# Patient Record
Sex: FEMALE | Race: BLACK OR AFRICAN AMERICAN | NOT HISPANIC OR LATINO | Employment: OTHER | URBAN - METROPOLITAN AREA
[De-identification: names, ages, dates, MRNs, and addresses within clinical notes are randomized per-mention and may not be internally consistent; named-entity substitution may affect disease eponyms.]

---

## 2017-01-09 DIAGNOSIS — I10 ESSENTIAL HYPERTENSION: ICD-10-CM

## 2017-01-09 RX ORDER — INDAPAMIDE 2.5 MG/1
TABLET ORAL
Qty: 30 TABLET | Refills: 11 | Status: SHIPPED | OUTPATIENT
Start: 2017-01-09 | End: 2017-03-19 | Stop reason: SDUPTHER

## 2017-01-16 ENCOUNTER — OFFICE VISIT (OUTPATIENT)
Dept: PAIN MEDICINE | Facility: CLINIC | Age: 67
End: 2017-01-16
Payer: MEDICARE

## 2017-01-16 VITALS
HEIGHT: 62 IN | HEART RATE: 89 BPM | RESPIRATION RATE: 16 BRPM | BODY MASS INDEX: 37.91 KG/M2 | DIASTOLIC BLOOD PRESSURE: 79 MMHG | SYSTOLIC BLOOD PRESSURE: 135 MMHG | WEIGHT: 206 LBS

## 2017-01-16 DIAGNOSIS — M19.012 PRIMARY OSTEOARTHRITIS OF BOTH SHOULDERS: ICD-10-CM

## 2017-01-16 DIAGNOSIS — M47.812 SPONDYLOSIS OF CERVICAL REGION WITHOUT MYELOPATHY OR RADICULOPATHY: ICD-10-CM

## 2017-01-16 DIAGNOSIS — M51.36 DDD (DEGENERATIVE DISC DISEASE), LUMBAR: ICD-10-CM

## 2017-01-16 DIAGNOSIS — M47.817 SPONDYLOSIS OF LUMBOSACRAL REGION WITHOUT MYELOPATHY OR RADICULOPATHY: ICD-10-CM

## 2017-01-16 DIAGNOSIS — M54.16 LEFT LUMBAR RADICULOPATHY: ICD-10-CM

## 2017-01-16 DIAGNOSIS — M19.011 PRIMARY OSTEOARTHRITIS OF BOTH SHOULDERS: ICD-10-CM

## 2017-01-16 PROCEDURE — 99499 UNLISTED E&M SERVICE: CPT | Mod: S$PBB,,, | Performed by: PHYSICIAN ASSISTANT

## 2017-01-16 PROCEDURE — 1159F MED LIST DOCD IN RCRD: CPT | Mod: S$GLB,,, | Performed by: PHYSICIAN ASSISTANT

## 2017-01-16 PROCEDURE — 99999 PR PBB SHADOW E&M-EST. PATIENT-LVL IV: CPT | Mod: PBBFAC,,, | Performed by: PHYSICIAN ASSISTANT

## 2017-01-16 PROCEDURE — 3078F DIAST BP <80 MM HG: CPT | Mod: S$GLB,,, | Performed by: PHYSICIAN ASSISTANT

## 2017-01-16 PROCEDURE — 3075F SYST BP GE 130 - 139MM HG: CPT | Mod: S$GLB,,, | Performed by: PHYSICIAN ASSISTANT

## 2017-01-16 PROCEDURE — 99214 OFFICE O/P EST MOD 30 MIN: CPT | Mod: S$GLB,,, | Performed by: PHYSICIAN ASSISTANT

## 2017-01-16 RX ORDER — TRAMADOL HYDROCHLORIDE AND ACETAMINOPHEN 37.5; 325 MG/1; MG/1
1 TABLET, FILM COATED ORAL 2 TIMES DAILY PRN
Qty: 60 TABLET | Refills: 2 | Status: SHIPPED | OUTPATIENT
Start: 2017-01-16 | End: 2017-02-15

## 2017-01-16 NOTE — PROGRESS NOTES
Chief Pain Complaint:  Low Back Pain, Mid Back Pain, Left Leg Pain  Bilateral Shoulder Pain      History of Present Illness:  This patient is a 66 y.o. female who presents today complaining of the above noted pain/s. The patient describes this pain as follows.    - duration of pain: pain for years, worse since stopping the mobic  - timing: intermittent   - character: aching, numbing  - radiating, dermatomal: extends into left leg along L5  - antecedent trauma, prior spinal surgery: , no prior spinal surgery   - pertinent negatives: No fever, No chills, No weight loss, No bladder dysfunction, No bowel dysfunction, No extremity weakness, No saddle anesthesia  - pertinent positives: none    - medications, other therapies tried (physical therapy, injections):     >> mobic    >> Has previously undergone Physical Therapy    >> Has NOT previously undergone spinal injection/s      IMAGING / Labs / Studies (reviewed on 1/16/2017):    12/19/16 Lumbar MRI wo contrast  Technique: Standard noncontrast multiplanar multisequence imaging of the lumbar spine was performed.  Findings: There is anatomic spinal alignment.  Degenerative vertebral endplate spurring, disc space narrowing, and distal desiccation present at multiple levels, most pronounced at T11-T12, L2-L3, L3-L4, and L4-L5.  Discogenic subendplate marrow signal changes also present at multiple levels.  Distal cord terminates at L1.  Paravertebral soft tissues are symmetric and normal in appearance.  Several right renal cysts measuring up to 2.2 cm.  T10-T11: Visible on sagittal sequences only.  Broad-based posterior disc bulge.  Bilateral foraminal narrowing.  T11-T12: Visible on sagittal sequences only.  Central disc bulge.  Bilateral foraminal narrowing.  T2/STIR signal hyperintensity within the cord substance suggesting myelomalacia.  T12-L1: Visible on sagittal sequences only.  Unremarkable.  L1-L2: Broad-based disc bulge with left foraminal/extraforaminal disc  protrusion.  Bilateral facet arthropathy and ligamentum flavum hypertrophy contributing to lateral recess encroachment.  Moderate left greater than right foraminal narrowing.  No significant canal stenosis.  L2-L3: Broad-based disc bulge with left greater than right foraminal/extraforaminal disc protrusions and posterior endplate lipping.  Facet arthropathy and ligamentum flavum hypertrophy.  Marked foraminal narrowing greater on the right.  Bilateral lateral recess encroachment.  L3-L4: Broad-based disc bulge, posterior endplate lipping, hypertrophic facet arthropathy, and ligamentum flavum hypertrophy.  Marked foraminal narrowing greater on the right.  Moderate to marked central canal stenosis with minimum AP canal diameter 7.1 mm.  L4-L5: Posterior disc bulge and endplate lipping with hypertrophic facet arthropathy and ligamentum flavum hypertrophy.  Marked marked bilateral foraminal narrowing and central canal stenosis and minimal AP canal diameter 9.4 mm.  L5-S1: Mild posterior disc bulge with left paracentral/foraminal asymmetry and hypertrophic facet arthropathy.  Moderate left and mild right foraminal narrowing.  No sitting canal stenosis.       9/13/12 X-Ray Lumbar Spine Complete 5 View    Narrative DATE OF EXAM: Sep 13 2012   Findings: Vertebral alignment is normal.  There is mild narrowing of the   disk spaces and early anterior osteophyte formation.  There are no   compression fractures or acute abnormalities are seen.       2/11/13 X-Ray Lumbar Spine Ap And Lateral    Narrative DATE OF EXAM: Feb 11 2013   Findings:   As compared with 9/13/12, no significant interim change is identified.         Review of Systems:  CONSTITUTIONAL: patient denies any fever, chills, or weight loss  SKIN: patient denies any rash or itching  RESPIRATORY: patient denies having any shortness of breath  GASTROINTESTINAL: patient denies having any diarrhea, constipation, or bowel incontinence  GENITOURINARY: patient denies  "having any abnormal bladder function    MUSCULOSKELETAL:  - patient complains of the above noted pain/s (see chief pain complaint)    NEUROLOGICAL:   - pain as above  - strength in Lower extremities is intact, BILATERALLY  - sensation in Upper extremities is intact, BILATERALLY  - patient denies any loss of bowel or bladder control      PSYCHIATRIC: patient denies any change in mood      Physical Exam:  Visit Vitals    /79 (BP Location: Right arm, Patient Position: Sitting, BP Method: Automatic)    Pulse 89    Resp 16    Ht 5' 2" (1.575 m)    Wt 93.4 kg (206 lb)    BMI 37.68 kg/m2    (reviewed on 1/16/2017)    General: alert and oriented, in no apparent distress  Gait: normal gait  Skin: No rashes, No discoloration, No obvious lesions  HEENT: EOMI  Respiratory: respirations nonlabored    Musculoskeletal:  - Any pain on flexion, extension, rotation:    >> pain on extension and rotation    >> facet loading causes pain bilaterally  - Straight Leg Raise:     >> LEFT :: negative    >> RIGHT :: negative  - Any tenderness to palpation across paraspinal muscles, joints, bursae:     >> across b/l GTB, mild    Neuro:  - Extremity Strength:     >> LEFT :: 5/5    >> RIGHT :: 5/5  - Extremity Reflexes:    >> LEFT  :: 2+    >> RIGHT :: 2+  - Sensory (sensation to light touch):    >> LEFT :: intact    >> RIGHT :: intact     Psych:  Mood and affect is appropriate      Assessment:  Lumbar Spondylosis  Lumbar Degenerative Disc Disease  Lumbar radiculopathy  Bilateral shoulder OA      Plan:  Patient presents for follow-up. She is complaining of widespread joint pain which became manifest after she discontinued Mobic. This was controlling her pain, but she had to d/c due to side effects of gastric irritation, which was evident on recent EGD.   - Lumbar MRI reviewed, detailed above. This was also previously reviewed by Dr. Springer, who referred her to [facility] to evaluate myelomalacia. She saw Dr. Stone (neurosurgeon at  " [facility]) on 12-28-16. He did not recommend emergent surgery considering she was not having any motor disturbances. This will be a last resort in her treatment plan, and he recommends injections for treatment if her pain worsens or becomes uncontrolled even with medication, which she also wants to wait on. She will follow-up with him in 2 months. We need to request records from Dr. Stone.  - Will refill Ultracet 37.5/325mg BID PRN pain x 3 months, which she feels is controlling her pain very well. She tries to skip doses due to fear of becoming dependent. She also uses compound cream, which is also very helpful for her.  - PT is also helping her, which she is going 2x per week. She feels she can move around better and can finally lay flat on her back, as she has been sleeping in a recliner for years.   RTC in 3 months for med refill. I discussed the risks, benefits, and alternatives to potential treatment options. All questions and concerns were fully addressed today in clinic. Dr. Springer was consulted regarding the patient plan and agrees.            >> PDI:  1/16/2017 :: 72    >>Opioid Risk Tool:  1/16/2017 :: 0

## 2017-01-16 NOTE — MR AVS SNAPSHOT
[facility]  [address] [place] [address]  Phone: [phone]  Fax: [phone]                  Judi Brown   2017 12:40 PM   Office Visit    Description:  Female : 1950   Provider:  More Ryan PA-C   Department:  [facility]           Reason for Visit     Low-back Pain           Diagnoses this Visit        Comments    Spondylosis of lumbosacral region without myelopathy or radiculopathy         DDD (degenerative disc disease), lumbar         Left lumbar radiculopathy         Spondylosis of cervical region without myelopathy or radiculopathy         Primary osteoarthritis of both shoulders                To Do List           Future Appointments        Provider Department Dept Phone    2017 12:40 PM More Ryan PA-C [facility] [phone]    4/10/2017 10:00 AM More Ryan PA-C [facility] [phone]    5/15/2017 9:55 AM [facility] [phone]    5/15/2017 10:00 AM [facility] US1 [facility] [phone]    5/15/2017 11:00 AM [facility] BMD1 [facility] [phone]      Goals (5 Years of Data)     None       These Medications        Disp Refills Start End    tramadol-acetaminophen 37.5-325 mg (ULTRACET) 37.5-325 mg Tab 60 tablet 2 2017 2/15/2017    Take 1 tablet by mouth 2 (two) times daily as needed for Pain. - Oral    Pharmacy: [id number] - [place] - [address] [facility] Ph #: [phone]         [facility]     [facility] Care [facility] -  Assistance  Registered nurses in the [facility] provide clinical advisement, health education, appointment booking, and other advisory services.  Call for this free service at [phone].             Medications           Message regarding Medications     Verify the changes and/or additions to your medication regime  listed below are the same as discussed with your clinician today.  If any of these changes or additions are incorrect, please notify your healthcare provider.             Verify that the below list of medications is an accurate representation of the medications you are currently taking.  If none reported, the list may be blank. If incorrect, please contact your healthcare provider. Carry this list with you in case of emergency.           Current Medications     albuterol (PROVENTIL HFA) 90 mcg/actuation inhaler Inhale 2 puffs into the lungs every 4 (four) hours as needed for Wheezing. 2 Aerosol Inhalation Every day    ergocalciferol (VITAMIN D2) 50,000 unit Cap Take 50,000 Units by mouth every 7 days.    FKCB flurbiprofen 10%- ketamine 2%- gabapentin 6%- bupivacaine 1% in lipoderm ActiveMax Apply 1-2 grams (2-4 pumps) to affected areas 3-4 times daily. CMPD 1 TDERMAL TD 16T- NO AMIRAH cream    fluticasone (FLONASE) 50 mcg/actuation nasal spray 2 sprays by Each Nare route daily as needed for Rhinitis. 2 Aerosol, Spray Nasal Every day    indapamide (LOZOL) 2.5 MG Tab Take 2.5 mg by mouth once daily.    indapamide (LOZOL) 2.5 MG Tab TAKE ONE TABLET BY MOUTH ONCE DAILY    meloxicam (MOBIC) 15 MG tablet TAKE ONE TABLET BY MOUTH ONCE DAILY AS NEEDED    mometasone (NASONEX) 50 mcg/actuation nasal spray 2 sprays by Nasal route daily as needed. 1 - 2 Spray, Non-Aerosol Nasal Every day.  1-2 sprays each nasal passage once a day, if needed    olopatadine (PATANOL) 0.1 % ophthalmic solution INSTILL 1 DROP IN EACH EYE TWICE A DAY.    potassium chloride (KLOR-CON) 10 MEQ TbSR Take 1 tablet (10 mEq total) by mouth once daily.    tramadol-acetaminophen 37.5-325 mg (ULTRACET) 37.5-325 mg Tab Take 1 tablet by mouth 2 (two) times daily as needed for Pain.           Clinical Reference Information           Vital Signs - Last Recorded  Most recent update: 1/16/2017  9:51 AM by Tete Ellis MA    BP Pulse Resp Ht Wt BMI    135/79 (BP  "Location: Right arm, Patient Position: Sitting, BP Method: Automatic) 89 16 5' 2" (1.575 m) 93.4 kg (206 lb) 37.68 kg/m2      Blood Pressure          Most Recent Value    BP  135/79      Allergies as of 1/16/2017     Aleve  [Naproxen Sodium]    Cephalexin    Codeine    Doxycycline    Demerol  [Meperidine]    Dilaudid  [Hydromorphone (Bulk)]    Erythromycin    Hydrocodone-acetaminophen    Ibuprofen    Neurontin [Gabapentin]    Penicillins      Immunizations Administered on Date of Encounter - 1/16/2017     None      "

## 2017-03-06 ENCOUNTER — OFFICE VISIT (OUTPATIENT)
Dept: URGENT CARE | Facility: CLINIC | Age: 67
End: 2017-03-06
Payer: MEDICARE

## 2017-03-06 VITALS
HEART RATE: 94 BPM | OXYGEN SATURATION: 98 % | HEIGHT: 62 IN | TEMPERATURE: 98 F | SYSTOLIC BLOOD PRESSURE: 130 MMHG | WEIGHT: 221.56 LBS | DIASTOLIC BLOOD PRESSURE: 70 MMHG | BODY MASS INDEX: 40.77 KG/M2

## 2017-03-06 DIAGNOSIS — B35.4 TINEA CORPORIS: Primary | ICD-10-CM

## 2017-03-06 PROCEDURE — 3075F SYST BP GE 130 - 139MM HG: CPT | Mod: S$GLB,,, | Performed by: NURSE PRACTITIONER

## 2017-03-06 PROCEDURE — 99999 PR PBB SHADOW E&M-EST. PATIENT-LVL III: CPT | Mod: PBBFAC,,, | Performed by: NURSE PRACTITIONER

## 2017-03-06 PROCEDURE — 3078F DIAST BP <80 MM HG: CPT | Mod: S$GLB,,, | Performed by: NURSE PRACTITIONER

## 2017-03-06 PROCEDURE — 99213 OFFICE O/P EST LOW 20 MIN: CPT | Mod: S$GLB,,, | Performed by: NURSE PRACTITIONER

## 2017-03-06 PROCEDURE — 1160F RVW MEDS BY RX/DR IN RCRD: CPT | Mod: S$GLB,,, | Performed by: NURSE PRACTITIONER

## 2017-03-06 RX ORDER — KETOCONAZOLE 20 MG/G
CREAM TOPICAL 2 TIMES DAILY
Qty: 30 G | Refills: 0 | Status: SHIPPED | OUTPATIENT
Start: 2017-03-06 | End: 2017-04-07 | Stop reason: ALTCHOICE

## 2017-03-06 NOTE — PROGRESS NOTES
Subjective:       Patient ID: Judi Brown is a 66 y.o. female.    Chief Complaint: Recurrent Skin Infections    Rash   This is a new problem. Episode onset: 1 1/2 weeks ago. The problem has been gradually improving since onset. Location: Left breast. The rash is characterized by redness and dryness. Pertinent negatives include no congestion, cough, diarrhea, fatigue, fever, shortness of breath, sore throat or vomiting. Past treatments include nothing. There is no history of asthma or eczema.     Review of Systems   Constitutional: Negative for fatigue and fever.   HENT: Negative for congestion, sinus pressure and sore throat.    Respiratory: Negative for cough and shortness of breath.    Gastrointestinal: Negative for abdominal pain, diarrhea, nausea and vomiting.   Skin: Positive for rash.   Neurological: Negative for headaches.   Psychiatric/Behavioral: Negative for behavioral problems and confusion.       Objective:      Physical Exam   Constitutional: She is oriented to person, place, and time. She appears well-developed and well-nourished.   Eyes: Conjunctivae are normal.   Neck: Normal range of motion.   Cardiovascular: Normal rate and regular rhythm.    Pulmonary/Chest: Effort normal.   Abdominal: Soft. She exhibits no distension.   Musculoskeletal: Normal range of motion.   Neurological: She is alert and oriented to person, place, and time.   Skin: There is erythema.   Dime sized circular raised erythematous area on the upper area of the left breast. There is a similar smaller area just below.   Psychiatric: She has a normal mood and affect. Her behavior is normal.   Vitals reviewed.      Assessment:       1. Tinea corporis        Plan:   Judi was seen today for recurrent skin infections.    Diagnoses and all orders for this visit:    Tinea corporis  -     ketoconazole (NIZORAL) 2 % cream; Apply topically 2 (two) times daily.     If symptoms worsen or fail to improve, follow-up with primary care doctor.  After visit summary given and discussed. Patient verbalized understanding and agrees with treatment plan. Patient remained stable and was discharged in no acute distress.

## 2017-03-06 NOTE — MR AVS SNAPSHOT
[facility]  [address] [place] [address]  Phone: [phone]  Fax: [phone]                  Judi Brown   3/6/2017 8:30 AM   Office Visit    Description:  Female : 1950   Provider:  DEJUAN Messer   Department:  [place] [facility]           Reason for Visit     Recurrent Skin Infections           Diagnoses this Visit        Comments    Tinea corporis    -  Primary            To Do List           Future Appointments        Provider Department Dept Phone    4/10/2017 10:00 AM More Ryan PA-C [facility] [phone]    5/15/2017 9:55 AM [facility] [phone]    5/15/2017 10:00 AM [facility] [phone]    5/15/2017 11:00 AM [facility] BMD1 [facility] [phone]    2017 10:20 AM [facility] [phone]      Goals (5 Years of Data)     None       These Medications        Disp Refills Start End    ketoconazole (NIZORAL) 2 % cream 30 g 0 3/6/2017     Apply topically 2 (two) times daily. - Topical (Top)    Pharmacy: [facility] [place] [place] [address] Ph #: [phone]         [facility]     [facility] -  Assistance  Registered nurses in the [facility] provide clinical advisement, health education, appointment booking, and other advisory services.  Call for this free service at [phone].             Medications           Message regarding Medications     Verify the changes and/or additions to your medication regime listed below are the same as discussed with your clinician today.  If any of these changes or additions are incorrect, please notify your healthcare provider.        START taking these NEW medications        Refills    ketoconazole (NIZORAL) 2 % cream 0    Sig: Apply topically 2 (two) times daily.    Class: Normal  "   Route: Topical (Top)           Verify that the below list of medications is an accurate representation of the medications you are currently taking.  If none reported, the list may be blank. If incorrect, please contact your healthcare provider. Carry this list with you in case of emergency.           Current Medications     albuterol (PROVENTIL HFA) 90 mcg/actuation inhaler Inhale 2 puffs into the lungs every 4 (four) hours as needed for Wheezing. 2 Aerosol Inhalation Every day    ergocalciferol (VITAMIN D2) 50,000 unit Cap Take 50,000 Units by mouth every 7 days.    FKCB flurbiprofen 10%- ketamine 2%- gabapentin 6%- bupivacaine 1% in lipoderm ActiveMax Apply 1-2 grams (2-4 pumps) to affected areas 3-4 times daily. CMPD 1 TDERMAL TD 16T- NO AMIRAH cream    indapamide (LOZOL) 2.5 MG Tab Take 2.5 mg by mouth once daily.    mometasone (NASONEX) 50 mcg/actuation nasal spray 2 sprays by Nasal route daily as needed. 1 - 2 Spray, Non-Aerosol Nasal Every day.  1-2 sprays each nasal passage once a day, if needed    olopatadine (PATANOL) 0.1 % ophthalmic solution INSTILL 1 DROP IN EACH EYE TWICE A DAY.    potassium chloride (KLOR-CON) 10 MEQ TbSR Take 1 tablet (10 mEq total) by mouth once daily.    fluticasone (FLONASE) 50 mcg/actuation nasal spray 2 sprays by Each Nare route daily as needed for Rhinitis. 2 Aerosol, Spray Nasal Every day    indapamide (LOZOL) 2.5 MG Tab TAKE ONE TABLET BY MOUTH ONCE DAILY    ketoconazole (NIZORAL) 2 % cream Apply topically 2 (two) times daily.           Clinical Reference Information           Your Vitals Were     BP Pulse Temp Height Weight SpO2    130/70 94 98.4 °F (36.9 °C) 5' 2" (1.575 m) 100.5 kg (221 lb 9 oz) 98%    BMI                40.52 kg/m2          Blood Pressure          Most Recent Value    BP  130/70      Allergies as of 3/6/2017     Aleve  [Naproxen Sodium]    Cephalexin    Codeine    Doxycycline    Demerol  [Meperidine]    Dilaudid  [Hydromorphone (Bulk)]    Erythromycin    " Hydrocodone-acetaminophen    Ibuprofen    Neurontin [Gabapentin]    Penicillins      Immunizations Administered on Date of Encounter - 3/6/2017     None      Instructions      Ringworm of the Skin    Ringworm is a fungal infection of the skin. Despite the name, a worm doesn't cause it. The cause of ringworm is a fungus that infects the outer layers of the skin. It is also not caused by bed bugs, scabies, or lice. These are totally different.  The medical term for ringworm is tinea. It can affect most parts of your body, although it seems to do better in moist areas of the body and around hair. It can be on almost any part of your body, including:  · Arms, hands, legs, chest, feet, and back  · Scalp  · Beard  · Groin  · Between the toes  Depending on where it is located, sometimes the name changes:  · Tinea capitis (scalp)  · Tinea cruris (groin)  · Tinea corporis (body)  · Tinea pedis (feet)  Causes  Ringworm is very common all over the world, including the [place] It can take less than 1 week up to 2 weeks before you develop the infection after being exposed. So, you may not figure out the exact cause.  It is spread through direct contact with:  · An infected person or animal  · Infected soil, or objects such as towels, clothing, and guajardo  Symptoms  At first you might not notice ringworm. Or you may just see a small, red, often raised itchy spot or pimple. Sometimes there may only be one spot. At other times there may be several. Ringworm can look slightly different on different parts of the body, but there are some things are always present:  · Irregular, round, oval or ring-shaped, which is why it's called ringworm  · Clearer or lighter color at the center, since it spreads from the center of the spot outward  · Red or inflamed look  · Raised  · Itchy  · Scaly, dry, or flaky  Home care  Follow these tips to help care for yourself at home:  · Leave it alone. Don't scratch at the rash or pick it. This can increase  the chance of infection and scarring.  · Take medicine as prescribed. If you were prescribed a cream, apply it exactly as directed. Make sure to put the cream not just on the rash, but also on the skin 1 or 2 inches around it. Medicine by mouth is sometimes needed, particularly for ringworm on the scalp. Take it as directed and until your healthcare provider says to stop.  · Keep it from spreading to others. Untreated ringworm of the skin is contagious by skin-to-skin contact. Your child may return to school 2 days after treatment has started.  Prevention  To some degree, prevention depends on what part of your body was affected. In general, the following good hygiene can help.  · Clean up after you get dirty or sweaty, or after using a locker room.  · When possible, dont share guajardo and brushes.  · Avoid having your skin and feet wet or damp for long periods.  · Wear clean, loose-fitting underwear.  Follow-up care  Follow up with your healthcare provider as advised by our staff if the rash does not improve after 10 days of treatment or if the rash spreads to other areas of the body.  When to seek medical advice  Call your healthcare provider right away if any of these occur:  · Redness around the rash gets worse  · Fluid drains from the rash  · Fever of 100.4ºF (38ºC) or higher, or as directed by your healthcare provider  Date Last Reviewed: 8/1/2016 © 2000-2016 [employer]. [address] [place] [address]. All rights reserved. This information is not intended as a substitute for professional medical care. Always follow your healthcare professional's instructions.             Language Assistance Services     ATTENTION: Language assistance services are available, free of charge. Please call [phone].      ATENCIÓN: Si sylvainla tania, tiene a kauffman disposición servicios gratuitos de asistencia lingüística. Llame al [phone].     LOIS Ý: N?u b?n nói Ti?ng Vi?t, có các d?ch v? h? tr?  ole yen? mi?n phí dành cho b?n. G?i s? [phone].         [facility] complies with applicable Federal civil rights laws and does not discriminate on the basis of race, color, national origin, age, disability, or sex.

## 2017-03-06 NOTE — PATIENT INSTRUCTIONS
Ringworm of the Skin    Ringworm is a fungal infection of the skin. Despite the name, a worm doesn't cause it. The cause of ringworm is a fungus that infects the outer layers of the skin. It is also not caused by bed bugs, scabies, or lice. These are totally different.  The medical term for ringworm is tinea. It can affect most parts of your body, although it seems to do better in moist areas of the body and around hair. It can be on almost any part of your body, including:  · Arms, hands, legs, chest, feet, and back  · Scalp  · Beard  · Groin  · Between the toes  Depending on where it is located, sometimes the name changes:  · Tinea capitis (scalp)  · Tinea cruris (groin)  · Tinea corporis (body)  · Tinea pedis (feet)  Causes  Ringworm is very common all over the world, including the [place] It can take less than 1 week up to 2 weeks before you develop the infection after being exposed. So, you may not figure out the exact cause.  It is spread through direct contact with:  · An infected person or animal  · Infected soil, or objects such as towels, clothing, and guajardo  Symptoms  At first you might not notice ringworm. Or you may just see a small, red, often raised itchy spot or pimple. Sometimes there may only be one spot. At other times there may be several. Ringworm can look slightly different on different parts of the body, but there are some things are always present:  · Irregular, round, oval or ring-shaped, which is why it's called ringworm  · Clearer or lighter color at the center, since it spreads from the center of the spot outward  · Red or inflamed look  · Raised  · Itchy  · Scaly, dry, or flaky  Home care  Follow these tips to help care for yourself at home:  · Leave it alone. Don't scratch at the rash or pick it. This can increase the chance of infection and scarring.  · Take medicine as prescribed. If you were prescribed a cream, apply it exactly as directed. Make sure to put the cream not just on the  rash, but also on the skin 1 or 2 inches around it. Medicine by mouth is sometimes needed, particularly for ringworm on the scalp. Take it as directed and until your healthcare provider says to stop.  · Keep it from spreading to others. Untreated ringworm of the skin is contagious by skin-to-skin contact. Your child may return to school 2 days after treatment has started.  Prevention  To some degree, prevention depends on what part of your body was affected. In general, the following good hygiene can help.  · Clean up after you get dirty or sweaty, or after using a locker room.  · When possible, dont share guajardo and brushes.  · Avoid having your skin and feet wet or damp for long periods.  · Wear clean, loose-fitting underwear.  Follow-up care  Follow up with your healthcare provider as advised by our staff if the rash does not improve after 10 days of treatment or if the rash spreads to other areas of the body.  When to seek medical advice  Call your healthcare provider right away if any of these occur:  · Redness around the rash gets worse  · Fluid drains from the rash  · Fever of 100.4ºF (38ºC) or higher, or as directed by your healthcare provider  Date Last Reviewed: 8/1/2016  © 7400-5703 [employer]. [address] [place] [address], [place] [address]. All rights reserved. This information is not intended as a substitute for professional medical care. Always follow your healthcare professional's instructions.

## 2017-03-19 ENCOUNTER — OFFICE VISIT (OUTPATIENT)
Dept: URGENT CARE | Facility: CLINIC | Age: 67
End: 2017-03-19
Payer: MEDICARE

## 2017-03-19 VITALS
WEIGHT: 226 LBS | HEART RATE: 88 BPM | DIASTOLIC BLOOD PRESSURE: 86 MMHG | HEIGHT: 62 IN | BODY MASS INDEX: 41.59 KG/M2 | SYSTOLIC BLOOD PRESSURE: 142 MMHG | TEMPERATURE: 97 F | OXYGEN SATURATION: 98 %

## 2017-03-19 DIAGNOSIS — J32.9 SINUSITIS, UNSPECIFIED CHRONICITY, UNSPECIFIED LOCATION: Primary | ICD-10-CM

## 2017-03-19 PROCEDURE — 3077F SYST BP >= 140 MM HG: CPT | Mod: S$GLB,,, | Performed by: NURSE PRACTITIONER

## 2017-03-19 PROCEDURE — 1160F RVW MEDS BY RX/DR IN RCRD: CPT | Mod: S$GLB,,, | Performed by: NURSE PRACTITIONER

## 2017-03-19 PROCEDURE — 99999 PR PBB SHADOW E&M-EST. PATIENT-LVL IV: CPT | Mod: PBBFAC,,, | Performed by: NURSE PRACTITIONER

## 2017-03-19 PROCEDURE — 99213 OFFICE O/P EST LOW 20 MIN: CPT | Mod: S$GLB,,, | Performed by: NURSE PRACTITIONER

## 2017-03-19 PROCEDURE — 3079F DIAST BP 80-89 MM HG: CPT | Mod: S$GLB,,, | Performed by: NURSE PRACTITIONER

## 2017-03-19 RX ORDER — PREDNISONE 20 MG/1
20 TABLET ORAL DAILY
Qty: 5 TABLET | Refills: 0 | Status: SHIPPED | OUTPATIENT
Start: 2017-03-19 | End: 2017-03-24

## 2017-03-19 RX ORDER — AZITHROMYCIN 250 MG/1
TABLET, FILM COATED ORAL
Qty: 6 TABLET | Refills: 0 | Status: SHIPPED | OUTPATIENT
Start: 2017-03-19 | End: 2017-03-24

## 2017-03-19 RX ORDER — BENZONATATE 200 MG/1
200 CAPSULE ORAL 3 TIMES DAILY PRN
Qty: 30 CAPSULE | Refills: 0 | Status: SHIPPED | OUTPATIENT
Start: 2017-03-19

## 2017-03-19 RX ORDER — DICLOFENAC SODIUM 10 MG/G
GEL TOPICAL
COMMUNITY
Start: 2017-03-01 | End: 2017-05-09 | Stop reason: ALTCHOICE

## 2017-03-19 NOTE — PROGRESS NOTES
"Subjective:      Patient ID: Judi Brown is a 66 y.o. female.    Chief Complaint: Sinus Problem    Sinus Problem   This is a new problem. Episode onset: 1 month with sinus congestion; now with severe right facial pain. The problem has been gradually worsening since onset. There has been no fever. Associated symptoms include congestion, coughing, headaches (right) and sinus pressure (right). Pertinent negatives include no chills or ear pain. Treatments tried: hot tea, throat lozenges, OTC meds. The treatment provided no relief.     Review of Systems   Constitutional: Negative.  Negative for chills and fever.   HENT: Positive for congestion and sinus pressure (right). Negative for ear pain.    Respiratory: Positive for cough.    Cardiovascular: Negative.    Gastrointestinal: Negative.    Musculoskeletal: Negative.    Skin: Negative.    Neurological: Positive for headaches (right).   Hematological: Negative.        Objective:   BP (!) 142/86 (BP Location: Right arm, Patient Position: Sitting, BP Method: Manual)  Pulse 88  Temp 97.4 °F (36.3 °C) (Tympanic)   Ht 5' 2" (1.575 m)  Wt 102.5 kg (225 lb 15.5 oz)  SpO2 98%  BMI 41.33 kg/m2  Physical Exam   Constitutional: She is oriented to person, place, and time. She appears well-developed and well-nourished. She is active and cooperative. No distress.   HENT:   Head: Normocephalic and atraumatic.   Right Ear: A middle ear effusion is present.   Left Ear: Tympanic membrane normal.   Nose: Mucosal edema and sinus tenderness present. Right sinus exhibits maxillary sinus tenderness and frontal sinus tenderness.   Mouth/Throat: Uvula is midline and mucous membranes are normal. Posterior oropharyngeal erythema present. No oropharyngeal exudate or posterior oropharyngeal edema.   Eyes: Right eye exhibits no discharge. Left eye exhibits no discharge.   Neck: Normal range of motion. Neck supple.   Cardiovascular: Regular rhythm and normal heart sounds.    Pulmonary/Chest: " Effort normal and breath sounds normal. She has no wheezes.   Musculoskeletal: Normal range of motion.   Lymphadenopathy:     She has no cervical adenopathy.   Neurological: She is alert and oriented to person, place, and time.   Skin: Skin is warm. No rash noted. She is not diaphoretic.   Nursing note and vitals reviewed.    Assessment:      1. Sinusitis, unspecified chronicity, unspecified location       Plan:   Sinusitis, unspecified chronicity, unspecified location  -     azithromycin (Z-JEREMIAH) 250 MG tablet; Take 2 tablets by mouth on day 1; Take 1 tablet by mouth on days 2-5  Dispense: 6 tablet; Refill: 0  -     predniSONE (DELTASONE) 20 MG tablet; Take 1 tablet (20 mg total) by mouth once daily.  Dispense: 5 tablet; Refill: 0  -     benzonatate (TESSALON) 200 MG capsule; Take 1 capsule (200 mg total) by mouth 3 (three) times daily as needed for Cough.  Dispense: 30 capsule; Refill: 0    Instructions, follow up, and supportive care as per AVS.  Follow up with PCP if not improved or for any new or worsening symptoms.  AVS provided and reviewed with patient including importance of antibiotic compliance, supportive care, follow up, and red flag symptoms. Patient verbalizes understanding and agrees with treatment plan. Discharged from Urgent Care in stable condition.

## 2017-03-19 NOTE — PATIENT INSTRUCTIONS
· Rest and increase fluids.   · May apply warm compresses as needed.   · Saline nasal spray or saline irrigation (Neti pot) to loosen nasal congestion.  · Flonase or Nasacort to reduce inflammation in the sinus cavities.  · Take antibiotics exactly as prescribed. Make sure to complete the entire course of antibiotics even if you start feeling better. This will prevent recurrence of your infection and bacterial resistance.   · Do not drive, drink alcohol, or take any other sedating medications or substances while taking cough syrup.   · Follow up with your primary care provider or with ENT if not improved within a few days or sooner for any new or worsening symptoms.   · Go to the ER for any fever that does not improve with Tylenol/Ibuprofen, neck stiffness, rash, severe headache, vision changes, shortness of breath, chest pain, severe facial pain or swelling, or for any other new and concerning symptoms.     Sinusitis (Antibiotic Treatment)    The sinuses are air-filled spaces within the bones of the face. They connect to the inside of the nose. Sinusitis is an inflammation of the tissue lining the sinus cavity. Sinus inflammation can occur during a cold. It can also be due to allergies to pollens and other particles in the air. Sinusitis can cause symptoms of sinus congestion and fullness. A sinus infection causes fever, headache and facial pain. There is often green or yellow drainage from the nose or into the back of the throat (post-nasal drip). You have been given antibiotics to treat this condition.  Home care:  · Take the full course of antibiotics as instructed. Do not stop taking them, even if you feel better.  · Drink plenty of water, hot tea, and other liquids. This may help thin mucus. It also may promote sinus drainage.  · Heat may help soothe painful areas of the face. Use a towel soaked in hot water. Or,  the shower and direct the hot spray onto your face. Using a vaporizer along with a  menthol rub at night may also help.   · An expectorant containing guaifenesin may help thin the mucus and promote drainage from the sinuses.  · Over-the-counter decongestants may be used unless a similar medicine was prescribed. Nasal sprays work the fastest. Use one that contains phenylephrine or oxymetazoline. First blow the nose gently. Then use the spray. Do not use these medicines more often than directed on the label or symptoms may get worse. You may also use tablets containing pseudoephedrine. Avoid products that combine ingredients, because side effects may be increased. Read labels. You can also ask the pharmacist for help. (NOTE: Persons with high blood pressure should not use decongestants. They can raise blood pressure.)  · Over-the-counter antihistamines may help if allergies contributed to your sinusitis.    · Do not use nasal rinses or irrigation during an acute sinus infection, unless told to by your health care provider. Rinsing may spread the infection to other sinuses.  · Use acetaminophen or ibuprofen to control pain, unless another pain medicine was prescribed. (If you have chronic liver or kidney disease or ever had a stomach ulcer, talk with your doctor before using these medicines. Aspirin should never be used in anyone under 18 years of age who is ill with a fever. It may cause severe liver damage.)  · Don't smoke. This can worsen symptoms.  Follow-up care  Follow up with your healthcare provider or our staff if you are not improving within the next week.  When to seek medical advice  Call your healthcare provider if any of these occur:  · Facial pain or headache becoming more severe  · Stiff neck  · Unusual drowsiness or confusion  · Swelling of the forehead or eyelids  · Vision problems, including blurred or double vision  · Fever of 100.4ºF (38ºC) or higher, or as directed by your healthcare provider  · Seizure  · Breathing problems  · Symptoms not resolving within 10 days  Date Last  Reviewed: 4/13/2015  © 7524-8113 [employer] [place]. [address] [place] [address], [place] [address]. All rights reserved. This information is not intended as a substitute for professional medical care. Always follow your healthcare professional's instructions.

## 2017-03-19 NOTE — MR AVS SNAPSHOT
[facility]  [address]  [place] [address]  Phone: [phone]  Fax: [phone]                  Judi Brown   3/19/2017 10:40 AM   Office Visit    Description:  Female : 1950   Provider:  Ayah Herron NP   Department:  [facility]           Reason for Visit     Sinus Problem           Diagnoses this Visit        Comments    Sinusitis, unspecified chronicity, unspecified location    -  Primary            To Do List           Future Appointments        Provider Department Dept Phone    4/10/2017 10:00 AM More Ryan PA-C [facility] [phone]    5/15/2017 9:55 AM [facility] [phone]    5/15/2017 10:00 AM [facility] US1 [facility] [phone]    5/15/2017 11:00 AM [facility] BMD1 [facility] [phone]    2017 10:20 AM [facility] [phone]      Goals (5 Years of Data)     None      Follow-Up and Disposition     Return if symptoms worsen or fail to improve.       These Medications        Disp Refills Start End    azithromycin (Z-JEREMIAH) 250 MG tablet 6 tablet 0 3/19/2017 3/24/2017    Take 2 tablets by mouth on day 1; Take 1 tablet by mouth on days 2-5    Pharmacy: [facility] [address] [place] [address] [address] Ph #: [phone]       predniSONE (DELTASONE) 20 MG tablet 5 tablet 0 3/19/2017 3/24/2017    Take 1 tablet (20 mg total) by mouth once daily. - Oral    Pharmacy: [facility] [address] [place] [address] [address] Ph #: [phone]       benzonatate (TESSALON) 200 MG capsule 30 capsule 0 3/19/2017     Take 1 capsule (200 mg total) by mouth 3 (three) times daily as needed for Cough. - Oral    Pharmacy: [facility] [address] [place], [place] [address] [address] Ph #: [phone]         [facility]     [facility] -  Assistance  Registered nurses in the  [facility] provide clinical advisement, health education, appointment booking, and other advisory services.  Call for this free service at [phone].             Medications           Message regarding Medications     Verify the changes and/or additions to your medication regime listed below are the same as discussed with your clinician today.  If any of these changes or additions are incorrect, please notify your healthcare provider.        START taking these NEW medications        Refills    azithromycin (Z-JEREMIAH) 250 MG tablet 0    Sig: Take 2 tablets by mouth on day 1; Take 1 tablet by mouth on days 2-5    Class: Normal    predniSONE (DELTASONE) 20 MG tablet 0    Sig: Take 1 tablet (20 mg total) by mouth once daily.    Class: Normal    Route: Oral    benzonatate (TESSALON) 200 MG capsule 0    Sig: Take 1 capsule (200 mg total) by mouth 3 (three) times daily as needed for Cough.    Class: Normal    Route: Oral           Verify that the below list of medications is an accurate representation of the medications you are currently taking.  If none reported, the list may be blank. If incorrect, please contact your healthcare provider. Carry this list with you in case of emergency.           Current Medications     albuterol (PROVENTIL HFA) 90 mcg/actuation inhaler Inhale 2 puffs into the lungs every 4 (four) hours as needed for Wheezing. 2 Aerosol Inhalation Every day    diclofenac sodium 1 % Gel     ergocalciferol (VITAMIN D2) 50,000 unit Cap Take 50,000 Units by mouth every 7 days.    FKCB flurbiprofen 10%- ketamine 2%- gabapentin 6%- bupivacaine 1% in lipoderm ActiveMax Apply 1-2 grams (2-4 pumps) to affected areas 3-4 times daily. CMPD 1 TDERMAL TD 16T- NO AMIRAH cream    fluticasone (FLONASE) 50 mcg/actuation nasal spray 2 sprays by Each Nare route daily as needed for Rhinitis. 2 Aerosol, Spray Nasal Every day    indapamide (LOZOL) 2.5 MG Tab Take 2.5 mg by mouth once daily.    ketoconazole (NIZORAL)  "2 % cream Apply topically 2 (two) times daily.    mometasone (NASONEX) 50 mcg/actuation nasal spray 2 sprays by Nasal route daily as needed. 1 - 2 Spray, Non-Aerosol Nasal Every day.  1-2 sprays each nasal passage once a day, if needed    olopatadine (PATANOL) 0.1 % ophthalmic solution INSTILL 1 DROP IN EACH EYE TWICE A DAY.    potassium chloride (KLOR-CON) 10 MEQ TbSR Take 1 tablet (10 mEq total) by mouth once daily.    azithromycin (Z-JEREMIAH) 250 MG tablet Take 2 tablets by mouth on day 1; Take 1 tablet by mouth on days 2-5    benzonatate (TESSALON) 200 MG capsule Take 1 capsule (200 mg total) by mouth 3 (three) times daily as needed for Cough.    predniSONE (DELTASONE) 20 MG tablet Take 1 tablet (20 mg total) by mouth once daily.           Clinical Reference Information           Your Vitals Were     BP Pulse Temp Height    142/86 (BP Location: Right arm, Patient Position: Sitting, BP Method: Manual) 88 97.4 °F (36.3 °C) (Tympanic) 5' 2" (1.575 m)    Weight SpO2 BMI    102.5 kg (225 lb 15.5 oz) 98% 41.33 kg/m2      Blood Pressure          Most Recent Value    BP  (!)  142/86      Allergies as of 3/19/2017     Aleve  [Naproxen Sodium]    Cephalexin    Codeine    Doxycycline    Demerol  [Meperidine]    Dilaudid  [Hydromorphone (Bulk)]    Erythromycin    Hydrocodone-acetaminophen    Ibuprofen    Neurontin [Gabapentin]    Penicillins      Immunizations Administered on Date of Encounter - 3/19/2017     None      Instructions    · Rest and increase fluids.   · May apply warm compresses as needed.   · Saline nasal spray or saline irrigation (Neti pot) to loosen nasal congestion.  · Flonase or Nasacort to reduce inflammation in the sinus cavities.  · Take antibiotics exactly as prescribed. Make sure to complete the entire course of antibiotics even if you start feeling better. This will prevent recurrence of your infection and bacterial resistance.   · Do not drive, drink alcohol, or take any other sedating medications or " substances while taking cough syrup.   · Follow up with your primary care provider or with ENT if not improved within a few days or sooner for any new or worsening symptoms.   · Go to the ER for any fever that does not improve with Tylenol/Ibuprofen, neck stiffness, rash, severe headache, vision changes, shortness of breath, chest pain, severe facial pain or swelling, or for any other new and concerning symptoms.     Sinusitis (Antibiotic Treatment)    The sinuses are air-filled spaces within the bones of the face. They connect to the inside of the nose. Sinusitis is an inflammation of the tissue lining the sinus cavity. Sinus inflammation can occur during a cold. It can also be due to allergies to pollens and other particles in the air. Sinusitis can cause symptoms of sinus congestion and fullness. A sinus infection causes fever, headache and facial pain. There is often green or yellow drainage from the nose or into the back of the throat (post-nasal drip). You have been given antibiotics to treat this condition.  Home care:  · Take the full course of antibiotics as instructed. Do not stop taking them, even if you feel better.  · Drink plenty of water, hot tea, and other liquids. This may help thin mucus. It also may promote sinus drainage.  · Heat may help soothe painful areas of the face. Use a towel soaked in hot water. Or,  the shower and direct the hot spray onto your face. Using a vaporizer along with a menthol rub at night may also help.   · An expectorant containing guaifenesin may help thin the mucus and promote drainage from the sinuses.  · Over-the-counter decongestants may be used unless a similar medicine was prescribed. Nasal sprays work the fastest. Use one that contains phenylephrine or oxymetazoline. First blow the nose gently. Then use the spray. Do not use these medicines more often than directed on the label or symptoms may get worse. You may also use tablets containing pseudoephedrine.  Avoid products that combine ingredients, because side effects may be increased. Read labels. You can also ask the pharmacist for help. (NOTE: Persons with high blood pressure should not use decongestants. They can raise blood pressure.)  · Over-the-counter antihistamines may help if allergies contributed to your sinusitis.    · Do not use nasal rinses or irrigation during an acute sinus infection, unless told to by your health care provider. Rinsing may spread the infection to other sinuses.  · Use acetaminophen or ibuprofen to control pain, unless another pain medicine was prescribed. (If you have chronic liver or kidney disease or ever had a stomach ulcer, talk with your doctor before using these medicines. Aspirin should never be used in anyone under 18 years of age who is ill with a fever. It may cause severe liver damage.)  · Don't smoke. This can worsen symptoms.  Follow-up care  Follow up with your healthcare provider or our staff if you are not improving within the next week.  When to seek medical advice  Call your healthcare provider if any of these occur:  · Facial pain or headache becoming more severe  · Stiff neck  · Unusual drowsiness or confusion  · Swelling of the forehead or eyelids  · Vision problems, including blurred or double vision  · Fever of 100.4ºF (38ºC) or higher, or as directed by your healthcare provider  · Seizure  · Breathing problems  · Symptoms not resolving within 10 days  Date Last Reviewed: 4/13/2015  © 3397-4060 [employer]. [address] [place] [address]. All rights reserved. This information is not intended as a substitute for professional medical care. Always follow your healthcare professional's instructions.             Language Assistance Services     ATTENTION: Language assistance services are available, free of charge. Please call [phone].      ATENCIÓN: Si habla tania, tiene a kauffman disposición servicios gratuitos de asistencia lingüística.  Zachery holland [phone].     LOIS Ý: N?u b?n nói Ti?ng Vi?t, có các d?ch v? h? tr? ngôn ng? mi?n phí dành cho b?n. G?i s? [phone].         [facility] complies with applicable Federal civil rights laws and does not discriminate on the basis of race, color, national origin, age, disability, or sex.

## 2017-03-27 RX ORDER — POTASSIUM CHLORIDE 750 MG/1
TABLET, EXTENDED RELEASE ORAL
Qty: 30 TABLET | Refills: 0 | Status: SHIPPED | OUTPATIENT
Start: 2017-03-27 | End: 2017-04-26 | Stop reason: SDUPTHER

## 2017-04-06 ENCOUNTER — TELEPHONE (OUTPATIENT)
Dept: INTERNAL MEDICINE | Facility: CLINIC | Age: 67
End: 2017-04-06

## 2017-04-06 NOTE — TELEPHONE ENCOUNTER
Pt complains of having the same symptoms as last year, loss of appetite and nausea. No fatigue like last time. She has noticed that her teeth are breaking. One tooth broke while chewing gum and another broke while eating a soft peppermint. Her teeth broke last year when she had these same symptoms, but she didn't think it was associated until now. She has an appt scheduled with  on 5/22. She wants to know if it's ok to wait until that appt to see  since she had these problems before or should she come in sooner?

## 2017-04-06 NOTE — TELEPHONE ENCOUNTER
----- Message from Riley Downey sent at 4/6/2017  8:30 AM CDT -----  Contact: Pt  Pt request call from nurse regarding symptoms coming back losing appetite and new symptoms are losing teeth and she has low vitamin d and calcium, please contact pt at [phone]

## 2017-04-07 ENCOUNTER — OFFICE VISIT (OUTPATIENT)
Dept: INTERNAL MEDICINE | Facility: CLINIC | Age: 67
End: 2017-04-07
Payer: MEDICARE

## 2017-04-07 ENCOUNTER — LAB VISIT (OUTPATIENT)
Dept: LAB | Facility: HOSPITAL | Age: 67
End: 2017-04-07
Attending: FAMILY MEDICINE
Payer: MEDICARE

## 2017-04-07 VITALS
WEIGHT: 220 LBS | TEMPERATURE: 99 F | OXYGEN SATURATION: 95 % | HEIGHT: 62 IN | HEART RATE: 90 BPM | BODY MASS INDEX: 40.48 KG/M2 | DIASTOLIC BLOOD PRESSURE: 78 MMHG | SYSTOLIC BLOOD PRESSURE: 130 MMHG

## 2017-04-07 DIAGNOSIS — E83.52 HYPERCALCEMIA: ICD-10-CM

## 2017-04-07 DIAGNOSIS — D50.9 IRON DEFICIENCY ANEMIA, UNSPECIFIED IRON DEFICIENCY ANEMIA TYPE: ICD-10-CM

## 2017-04-07 DIAGNOSIS — R63.4 WEIGHT LOSS: ICD-10-CM

## 2017-04-07 DIAGNOSIS — L83 ACANTHOSIS NIGRICANS: ICD-10-CM

## 2017-04-07 DIAGNOSIS — S02.5XXA CLOSED FRACTURE OF TOOTH, INITIAL ENCOUNTER: Primary | ICD-10-CM

## 2017-04-07 LAB
25(OH)D3+25(OH)D2 SERPL-MCNC: 17 NG/ML
PTH-INTACT SERPL-MCNC: 82 PG/ML
T4 FREE SERPL-MCNC: 1.15 NG/DL
TSH SERPL DL<=0.005 MIU/L-ACNC: 1.13 UIU/ML

## 2017-04-07 PROCEDURE — 36415 COLL VENOUS BLD VENIPUNCTURE: CPT | Mod: PO

## 2017-04-07 PROCEDURE — 84439 ASSAY OF FREE THYROXINE: CPT

## 2017-04-07 PROCEDURE — 99999 PR PBB SHADOW E&M-EST. PATIENT-LVL III: CPT | Mod: PBBFAC,,, | Performed by: PHYSICIAN ASSISTANT

## 2017-04-07 PROCEDURE — 82330 ASSAY OF CALCIUM: CPT

## 2017-04-07 PROCEDURE — 84443 ASSAY THYROID STIM HORMONE: CPT

## 2017-04-07 PROCEDURE — 83970 ASSAY OF PARATHORMONE: CPT

## 2017-04-07 PROCEDURE — 3078F DIAST BP <80 MM HG: CPT | Mod: S$GLB,,, | Performed by: PHYSICIAN ASSISTANT

## 2017-04-07 PROCEDURE — 3075F SYST BP GE 130 - 139MM HG: CPT | Mod: S$GLB,,, | Performed by: PHYSICIAN ASSISTANT

## 2017-04-07 PROCEDURE — 82306 VITAMIN D 25 HYDROXY: CPT

## 2017-04-07 PROCEDURE — 99214 OFFICE O/P EST MOD 30 MIN: CPT | Mod: S$GLB,,, | Performed by: PHYSICIAN ASSISTANT

## 2017-04-07 RX ORDER — TRAMADOL HYDROCHLORIDE AND ACETAMINOPHEN 37.5; 325 MG/1; MG/1
TABLET, FILM COATED ORAL
COMMUNITY
Start: 2017-04-03 | End: 2017-04-10 | Stop reason: SDUPTHER

## 2017-04-07 NOTE — MR AVS SNAPSHOT
[facility]  [address] [place] [address]  Phone: [phone]  Fax: [phone]                  Judi Brown   2017 11:40 AM   Office Visit    Description:  Female : 1950   Provider:  DELIO Britton   [facility]           Reason for Visit     Anorexia     Dental Pain           Diagnoses this Visit        Comments    Closed fracture of tooth, initial encounter    -  Primary     Hypercalcemia         Weight loss         Iron deficiency anemia, unspecified iron deficiency anemia type         Acanthosis nigricans                To Do List           Future Appointments        Provider Department Dept Phone    2017 2:50 PM LAB[place]ATORY, [place] [facility] [phone]    4/10/2017 10:00 AM DELIO Rosales-C [facility] [place] [phone]    5/15/2017 9:55 AM [place], [place] [facility] [phone]    5/15/2017 10:00 AM [facility] US1 [facility] [phone]    5/15/2017 11:00 AM [facility] BMD1 [facility] [phone]      Goals (5 Years of Data)     None      [facility]     [facility] Care [facility] - 24/ Assistance  Unless otherwise directed by your provider, please contact [facility], our nurse care line that is available for  assistance.     Registered nurses in the [facility] provide: appointment scheduling, clinical advisement, health education, and other advisory services.  Call: [phone] (toll free)               Medications           Message regarding Medications     Verify the changes and/or additions to your medication regime listed below are the same as discussed with your clinician today.  If any of these changes or additions are incorrect, please notify your healthcare provider.        STOP taking these medications     albuterol (PROVENTIL HFA) 90 mcg/actuation inhaler Inhale 2 puffs into  "the lungs every 4 (four) hours as needed for Wheezing. 2 Aerosol Inhalation Every day    FKCB flurbiprofen 10%- ketamine 2%- gabapentin 6%- bupivacaine 1% in lipoderm ActiveMax Apply 1-2 grams (2-4 pumps) to affected areas 3-4 times daily. CMPD 1 TDERMAL TD 16T- NO AMIRAH cream    ketoconazole (NIZORAL) 2 % cream Apply topically 2 (two) times daily.    mometasone (NASONEX) 50 mcg/actuation nasal spray 2 sprays by Nasal route daily as needed. 1 - 2 Spray, Non-Aerosol Nasal Every day.  1-2 sprays each nasal passage once a day, if needed           Verify that the below list of medications is an accurate representation of the medications you are currently taking.  If none reported, the list may be blank. If incorrect, please contact your healthcare provider. Carry this list with you in case of emergency.           Current Medications     benzonatate (TESSALON) 200 MG capsule Take 1 capsule (200 mg total) by mouth 3 (three) times daily as needed for Cough.    diclofenac sodium 1 % Gel     fluticasone (FLONASE) 50 mcg/actuation nasal spray 2 sprays by Each Nare route daily as needed for Rhinitis. 2 Aerosol, Spray Nasal Every day    indapamide (LOZOL) 2.5 MG Tab Take 2.5 mg by mouth once daily.    olopatadine (PATANOL) 0.1 % ophthalmic solution INSTILL 1 DROP IN EACH EYE TWICE A DAY.    potassium chloride (KLOR-CON) 10 MEQ TbSR TAKE ONE TABLET BY MOUTH ONCE DAILY    ergocalciferol (VITAMIN D2) 50,000 unit Cap Take 50,000 Units by mouth every 7 days.    tramadol-acetaminophen 37.5-325 mg (ULTRACET) 37.5-325 mg Tab            Clinical Reference Information           Your Vitals Were     BP Pulse Temp Height Weight SpO2    130/78 90 98.7 °F (37.1 °C) (Tympanic) 5' 2" (1.575 m) 99.8 kg (220 lb) 95%    BMI                40.24 kg/m2          Blood Pressure          Most Recent Value    BP  130/78      Allergies as of 4/7/2017     Aleve  [Naproxen Sodium]    Cephalexin    Codeine    Doxycycline    Demerol  [Meperidine]    Dilaudid "  [Hydromorphone (Bulk)]    Erythromycin    Hydrocodone-acetaminophen    Ibuprofen    Neurontin [Gabapentin]    Penicillins      Immunizations Administered on Date of Encounter - 4/7/2017     None      Orders Placed During Today's Visit     Future Labs/Procedures Expected by Expires    Calcium, ionized  4/7/2017 6/6/2018    CBC auto differential  4/7/2017 6/6/2018    Comprehensive metabolic panel  4/7/2017 6/6/2018    INSULIN, RANDOM  4/7/2017 6/6/2018    PTH, intact  4/7/2017 6/6/2018    T4, free  4/7/2017 6/6/2018    TSH  4/7/2017 6/6/2018    Vitamin D  4/7/2017 6/6/2018      Language Assistance Services     ATTENTION: Language assistance services are available, free of charge. Please call [phone].      ATENCIÓN: Si habla tania, tiene a kauffman disposición servicios gratuitos de asistencia lingüística. Llame al [phone].     CHÚ Ý: N?u b?n nói Ti?ng Vi?t, có các d?ch v? h? tr? ngôn ng? mi?n phí dành cho b?n. G?i s? [phone].         [facility] complies with applicable Federal civil rights laws and does not discriminate on the basis of race, color, national origin, age, disability, or sex.

## 2017-04-07 NOTE — PROGRESS NOTES
Subjective:       Patient ID: Judi Brown is a 66 y.o. female.    Chief Complaint: Anorexia and Dental Pain    HPI  Patient comes in today for a few complaints.   First she lost some weight unintentionally. She was worked up by PCP and no cause was found. She did have low Vit D and her calcium was off as well. This normalized at last labs.  Recently, she noticed her teeth are breaking easily and she is having to get dental work.    She also does not have much of an appetite and feels nauseous at times.   . She also has a history of MILTON   Past Medical History:   Diagnosis Date    Allergy     Arthritis     Hypertension        Current Outpatient Prescriptions   Medication Sig Dispense Refill    benzonatate (TESSALON) 200 MG capsule Take 1 capsule (200 mg total) by mouth 3 (three) times daily as needed for Cough. 30 capsule 0    diclofenac sodium 1 % Gel       fluticasone (FLONASE) 50 mcg/actuation nasal spray 2 sprays by Each Nare route daily as needed for Rhinitis. 2 Aerosol, Spray Nasal Every day 1 Bottle 3    indapamide (LOZOL) 2.5 MG Tab Take 2.5 mg by mouth once daily.      olopatadine (PATANOL) 0.1 % ophthalmic solution INSTILL 1 DROP IN EACH EYE TWICE A DAY. 5 mL 4    potassium chloride (KLOR-CON) 10 MEQ TbSR TAKE ONE TABLET BY MOUTH ONCE DAILY 30 tablet 0    ergocalciferol (VITAMIN D2) 50,000 unit Cap Take 50,000 Units by mouth every 7 days.      tramadol-acetaminophen 37.5-325 mg (ULTRACET) 37.5-325 mg Tab        No current facility-administered medications for this visit.        Review of Systems   Constitutional: Positive for appetite change.   HENT: Positive for dental problem.    Eyes: Negative.    Respiratory: Negative.    Cardiovascular: Negative.    Gastrointestinal: Negative.    Endocrine: Negative.    Genitourinary: Negative.    Musculoskeletal: Negative.    Skin: Positive for color change.        Dark rash on neck    Allergic/Immunologic: Negative.    Neurological: Negative.   "  Hematological: Negative.    Psychiatric/Behavioral: Negative.    All other systems reviewed and are negative.      Objective:   /78  Pulse 90  Temp 98.7 °F (37.1 °C) (Tympanic)   Ht 5' 2" (1.575 m)  Wt 99.8 kg (220 lb)  SpO2 95%  BMI 40.24 kg/m2     Physical Exam   Constitutional: She is oriented to person, place, and time. She appears well-developed and well-nourished. No distress.   HENT:   Head: Normocephalic and atraumatic.   Right Ear: Hearing, tympanic membrane, external ear and ear canal normal.   Left Ear: Hearing, tympanic membrane, external ear and ear canal normal.   Nose: No sinus tenderness. Right sinus exhibits no maxillary sinus tenderness and no frontal sinus tenderness. Left sinus exhibits no maxillary sinus tenderness and no frontal sinus tenderness.   Mouth/Throat: Uvula is midline, oropharynx is clear and moist and mucous membranes are normal. No oropharyngeal exudate, posterior oropharyngeal edema, posterior oropharyngeal erythema or tonsillar abscesses.   Eyes: Conjunctivae are normal. Pupils are equal, round, and reactive to light.   Neck: Normal range of motion. Neck supple.   Cardiovascular: Normal rate, regular rhythm and normal heart sounds.    Pulmonary/Chest: Effort normal and breath sounds normal. No respiratory distress.   Abdominal: Soft. Bowel sounds are normal.   Neurological: She is alert and oriented to person, place, and time.   Skin: Skin is warm.   Psychiatric: She has a normal mood and affect. Her behavior is normal. Judgment and thought content normal.   Vitals reviewed.        Lab Results   Component Value Date    WBC 5.25 10/08/2016    HGB 13.0 10/08/2016    HCT 37.7 10/08/2016     10/08/2016    CHOL 182 01/12/2016    TRIG 77 01/12/2016    HDL 42 01/12/2016    ALT 25 10/31/2016    AST 24 10/31/2016     10/31/2016    K 4.5 10/31/2016     10/31/2016    CREATININE 1.0 10/31/2016    BUN 18 10/31/2016    CO2 26 10/31/2016    TSH 0.877 10/07/2016 "    GLUF 89 04/30/2008    HGBA1C 5.1 10/13/2016       Assessment:       1. Closed fracture of tooth, initial encounter    2. Hypercalcemia    3. Weight loss    4. Iron deficiency anemia, unspecified iron deficiency anemia type    5. Acanthosis nigricans        Plan:   Closed fracture of tooth, initial encounter    Hypercalcemia  -     PTH, intact; Future; Expected date: 4/7/17  -     Calcium, ionized; Future; Expected date: 4/7/17  -     Vitamin D; Future; Expected date: 4/7/17  -     Comprehensive metabolic panel; Future; Expected date: 4/7/17    Weight loss  -     TSH; Future; Expected date: 4/7/17  -     T4, free; Future; Expected date: 4/7/17    Iron deficiency anemia, unspecified iron deficiency anemia type  -     CBC auto differential; Future; Expected date: 4/7/17  -     Comprehensive metabolic panel; Future; Expected date: 4/7/17    Acanthosis nigricans  -     INSULIN, RANDOM; Future; Expected date: 4/7/17    suspicious for a PTH issue given the Vit D and calcium labs a few months ago   Will draw labs and follow up

## 2017-04-08 LAB — CA-I BLDV-SCNC: 1.32 MMOL/L

## 2017-04-10 ENCOUNTER — OFFICE VISIT (OUTPATIENT)
Dept: PAIN MEDICINE | Facility: CLINIC | Age: 67
End: 2017-04-10
Payer: MEDICARE

## 2017-04-10 VITALS
HEIGHT: 62 IN | SYSTOLIC BLOOD PRESSURE: 134 MMHG | BODY MASS INDEX: 40.48 KG/M2 | HEART RATE: 82 BPM | DIASTOLIC BLOOD PRESSURE: 75 MMHG | WEIGHT: 220 LBS

## 2017-04-10 DIAGNOSIS — G95.89 MYELOMALACIA: ICD-10-CM

## 2017-04-10 DIAGNOSIS — M19.011 PRIMARY OSTEOARTHRITIS OF BOTH SHOULDERS: ICD-10-CM

## 2017-04-10 DIAGNOSIS — M54.16 LEFT LUMBAR RADICULOPATHY: ICD-10-CM

## 2017-04-10 DIAGNOSIS — M47.812 SPONDYLOSIS OF CERVICAL REGION WITHOUT MYELOPATHY OR RADICULOPATHY: ICD-10-CM

## 2017-04-10 DIAGNOSIS — M51.36 DDD (DEGENERATIVE DISC DISEASE), LUMBAR: ICD-10-CM

## 2017-04-10 DIAGNOSIS — M19.012 PRIMARY OSTEOARTHRITIS OF BOTH SHOULDERS: ICD-10-CM

## 2017-04-10 DIAGNOSIS — M47.817 SPONDYLOSIS OF LUMBOSACRAL REGION WITHOUT MYELOPATHY OR RADICULOPATHY: Primary | ICD-10-CM

## 2017-04-10 PROCEDURE — 3075F SYST BP GE 130 - 139MM HG: CPT | Mod: S$GLB,,, | Performed by: PHYSICIAN ASSISTANT

## 2017-04-10 PROCEDURE — 3078F DIAST BP <80 MM HG: CPT | Mod: S$GLB,,, | Performed by: PHYSICIAN ASSISTANT

## 2017-04-10 PROCEDURE — 99214 OFFICE O/P EST MOD 30 MIN: CPT | Mod: S$GLB,,, | Performed by: PHYSICIAN ASSISTANT

## 2017-04-10 PROCEDURE — 1160F RVW MEDS BY RX/DR IN RCRD: CPT | Mod: S$GLB,,, | Performed by: PHYSICIAN ASSISTANT

## 2017-04-10 PROCEDURE — 99499 UNLISTED E&M SERVICE: CPT | Mod: S$PBB,,, | Performed by: PHYSICIAN ASSISTANT

## 2017-04-10 PROCEDURE — 99999 PR PBB SHADOW E&M-EST. PATIENT-LVL III: CPT | Mod: PBBFAC,,, | Performed by: PHYSICIAN ASSISTANT

## 2017-04-10 RX ORDER — TRAMADOL HYDROCHLORIDE AND ACETAMINOPHEN 37.5; 325 MG/1; MG/1
1 TABLET, FILM COATED ORAL 2 TIMES DAILY PRN
Qty: 60 TABLET | Refills: 2 | Status: SHIPPED | OUTPATIENT
Start: 2017-04-10 | End: 2017-05-10

## 2017-04-10 NOTE — MR AVS SNAPSHOT
[facility]  [address] [place] [address]  Phone: [phone]  Fax: [phone]                  Judi Brown   4/10/2017 10:00 AM   Office Visit    Description:  Female : 1950   Provider:  More Ryan PA-C   Department:  [facility]           Reason for Visit     Medication Refill           Diagnoses this Visit        Comments    Spondylosis of lumbosacral region without myelopathy or radiculopathy    -  Primary     DDD (degenerative disc disease), lumbar                To Do List           Future Appointments        Provider Department Dept Phone    5/15/2017 9:55 AM [facility] [place] [facility] [phone]    5/15/2017 10:00 AM [facility] US1 [facility] [phone]    5/15/2017 11:00 AM [facility] BMD1 [facility] [phone]    2017 10:20 AM Peña Castro MD [facility] [phone]    7/3/2017 10:00 AM More Ryan PA-C [facility] [phone]      Goals (5 Years of Data)     None       These Medications        Disp Refills Start End    tramadol-acetaminophen 37.5-325 mg (ULTRACET) 37.5-325 mg Tab 60 tablet 2 4/10/2017 5/10/2017    Take 1 tablet by mouth 2 (two) times daily as needed for Pain. - Oral    Pharmacy: [id number] [place] - [address] [facility] Ph #: [phone]         [facility]     [facility] Care Line -  Assistance  Unless otherwise directed by your provider, please contact [facility], our nurse care line that is available for  assistance.     Registered nurses in the [facility] provide: appointment scheduling, clinical advisement, health education, and other advisory services.  Call: [phone] (toll free)               Medications           Message regarding Medications     Verify the changes and/or additions to your medication regime listed below are the  "same as discussed with your clinician today.  If any of these changes or additions are incorrect, please notify your healthcare provider.        CHANGE how you are taking these medications     Start Taking Instead of    tramadol-acetaminophen 37.5-325 mg (ULTRACET) 37.5-325 mg Tab tramadol-acetaminophen 37.5-325 mg (ULTRACET) 37.5-325 mg Tab    Dosage:  Take 1 tablet by mouth 2 (two) times daily as needed for Pain.     Reason for Change:  Reorder            Verify that the below list of medications is an accurate representation of the medications you are currently taking.  If none reported, the list may be blank. If incorrect, please contact your healthcare provider. Carry this list with you in case of emergency.           Current Medications     benzonatate (TESSALON) 200 MG capsule Take 1 capsule (200 mg total) by mouth 3 (three) times daily as needed for Cough.    diclofenac sodium 1 % Gel     ergocalciferol (VITAMIN D2) 50,000 unit Cap Take 50,000 Units by mouth every 7 days.    fluticasone (FLONASE) 50 mcg/actuation nasal spray 2 sprays by Each Nare route daily as needed for Rhinitis. 2 Aerosol, Spray Nasal Every day    indapamide (LOZOL) 2.5 MG Tab Take 2.5 mg by mouth once daily.    olopatadine (PATANOL) 0.1 % ophthalmic solution INSTILL 1 DROP IN EACH EYE TWICE A DAY.    potassium chloride (KLOR-CON) 10 MEQ TbSR TAKE ONE TABLET BY MOUTH ONCE DAILY    tramadol-acetaminophen 37.5-325 mg (ULTRACET) 37.5-325 mg Tab Take 1 tablet by mouth 2 (two) times daily as needed for Pain.           Clinical Reference Information           Your Vitals Were     BP Pulse Height Weight BMI    134/75 82 5' 2" (1.575 m) 99.8 kg (220 lb) 40.24 kg/m2      Blood Pressure          Most Recent Value    BP  134/75      Allergies as of 4/10/2017     Aleve  [Naproxen Sodium]    Cephalexin    Codeine    Doxycycline    Demerol  [Meperidine]    Dilaudid  [Hydromorphone (Bulk)]    Erythromycin    Hydrocodone-acetaminophen    Ibuprofen    " Neurontin [Gabapentin]    Penicillins      Immunizations Administered on Date of Encounter - 4/10/2017     None      Language Assistance Services     ATTENTION: Language assistance services are available, free of charge. Please call [phone].      ATENCIÓN: Si tj marin, tiene a kauffman disposición servicios gratuitos de asistencia lingüística. Llame al [phone].     CHÚ Ý: N?u b?n nói Ti?ng Vi?t, có các d?ch v? h? tr? ngôn ng? mi?n phí dành cho b?n. G?i s? [phone].         [facility] complies with applicable Federal civil rights laws and does not discriminate on the basis of race, color, national origin, age, disability, or sex.

## 2017-04-10 NOTE — PROGRESS NOTES
Chief Pain Complaint:  Low Back Pain, Mid Back Pain, Left Leg Pain  Bilateral Shoulder Pain      History of Present Illness:  This patient is a 66 y.o. female who presents today complaining of the above noted pain/s. The patient describes this pain as follows.    - duration of pain: pain for years, worse since stopping the mobic  - timing: intermittent   - character: aching, numbing  - radiating, dermatomal: extends into left leg along L5  - antecedent trauma, prior spinal surgery: , no prior spinal surgery   - pertinent negatives: No fever, No chills, No weight loss, No bladder dysfunction, No bowel dysfunction, No extremity weakness, No saddle anesthesia  - pertinent positives: none    - medications, other therapies tried (physical therapy, injections):     >> Medications: Mobic (helped but had to d/c due to gastric irritation), Ultracet (helps), compound cream (helps)    >> Has previously undergone Physical Therapy, which was very helpful    >> Has NOT previously undergone spinal injection/s      IMAGING / Labs / Studies (reviewed on 4/10/2017):    12/19/16 Lumbar MRI wo contrast  Technique: Standard noncontrast multiplanar multisequence imaging of the lumbar spine was performed.  Findings: There is anatomic spinal alignment.  Degenerative vertebral endplate spurring, disc space narrowing, and distal desiccation present at multiple levels, most pronounced at T11-T12, L2-L3, L3-L4, and L4-L5.  Discogenic subendplate marrow signal changes also present at multiple levels.  Distal cord terminates at L1.  Paravertebral soft tissues are symmetric and normal in appearance.  Several right renal cysts measuring up to 2.2 cm.  T10-T11: Visible on sagittal sequences only.  Broad-based posterior disc bulge.  Bilateral foraminal narrowing.  T11-T12: Visible on sagittal sequences only.  Central disc bulge.  Bilateral foraminal narrowing.  T2/STIR signal hyperintensity within the cord substance suggesting myelomalacia.  T12-L1:  Visible on sagittal sequences only.  Unremarkable.  L1-L2: Broad-based disc bulge with left foraminal/extraforaminal disc protrusion.  Bilateral facet arthropathy and ligamentum flavum hypertrophy contributing to lateral recess encroachment.  Moderate left greater than right foraminal narrowing.  No significant canal stenosis.  L2-L3: Broad-based disc bulge with left greater than right foraminal/extraforaminal disc protrusions and posterior endplate lipping.  Facet arthropathy and ligamentum flavum hypertrophy.  Marked foraminal narrowing greater on the right.  Bilateral lateral recess encroachment.  L3-L4: Broad-based disc bulge, posterior endplate lipping, hypertrophic facet arthropathy, and ligamentum flavum hypertrophy.  Marked foraminal narrowing greater on the right.  Moderate to marked central canal stenosis with minimum AP canal diameter 7.1 mm.  L4-L5: Posterior disc bulge and endplate lipping with hypertrophic facet arthropathy and ligamentum flavum hypertrophy.  Marked marked bilateral foraminal narrowing and central canal stenosis and minimal AP canal diameter 9.4 mm.  L5-S1: Mild posterior disc bulge with left paracentral/foraminal asymmetry and hypertrophic facet arthropathy.  Moderate left and mild right foraminal narrowing.  No sitting canal stenosis.       9/13/12 X-Ray Lumbar Spine Complete 5 View    Narrative DATE OF EXAM: Sep 13 2012   Findings: Vertebral alignment is normal.  There is mild narrowing of the   disk spaces and early anterior osteophyte formation.  There are no   compression fractures or acute abnormalities are seen.       2/11/13 X-Ray Lumbar Spine Ap And Lateral    Narrative DATE OF EXAM: Feb 11 2013   Findings:   As compared with 9/13/12, no significant interim change is identified.         Review of Systems:  CONSTITUTIONAL: patient denies any fever, chills, or weight loss  SKIN: patient denies any rash or itching  RESPIRATORY: patient denies having any shortness of  "breath  GASTROINTESTINAL: patient denies having any diarrhea, constipation, or bowel incontinence  GENITOURINARY: patient denies having any abnormal bladder function    MUSCULOSKELETAL:  - patient complains of the above noted pain/s (see chief pain complaint)    NEUROLOGICAL:   - pain as above  - strength in Lower extremities is intact, BILATERALLY  - sensation in Upper extremities is intact, BILATERALLY  - patient denies any loss of bowel or bladder control      PSYCHIATRIC: patient denies any change in mood      Physical Exam:    Vitals:  /75  Pulse 82  Ht 5' 2" (1.575 m)  Wt 99.8 kg (220 lb)  BMI 40.24 kg/m2   (reviewed on 4/10/2017)    General: alert and oriented, in no apparent distress, overweight  Gait: normal gait  Skin: No rashes, No discoloration, No obvious lesions  HEENT: EOMI  Respiratory: respirations nonlabored    Musculoskeletal:  - Any pain on flexion, extension, rotation:    >> pain on extension and rotation    >> facet loading causes pain bilaterally  - Straight Leg Raise:     >> LEFT :: negative    >> RIGHT :: negative  - Any tenderness to palpation across paraspinal muscles, joints, bursae:     >> across b/l GTB, mild    Neuro:  - Extremity Strength:     >> LEFT :: 5/5    >> RIGHT :: 5/5  - Extremity Reflexes:    >> LEFT  :: 2+    >> RIGHT :: 2+  - Sensory (sensation to light touch):    >> LEFT :: intact    >> RIGHT :: intact     Psych:  Mood and affect is appropriate      Assessment:  Lumbar Spondylosis  Lumbar Degenerative Disc Disease  Lumbar radiculopathy  Myelomalacia  Bilateral shoulder OA      Plan:  Patient presents for follow-up. She is complaining of widespread joint pain which became manifest after she discontinued Mobic. This was controlling her pain, but she had to d/c due to side effects of gastric irritation, which was evident on recent EGD. Lumbar MRI shows myelomalacia, so she saw Dr. Stone (neurosurgeon at [facility]) on 12-28-16 who did not recommend emergent " surgery considering she was not having any motor disturbances. This will be a last resort in her treatment plan, and he recommends injections for treatment if her pain worsens or becomes uncontrolled even with medication, which she also wants to wait on.   - She has completed PT, which she found to be helpful not only with pain control but improvement in ROM. She will continue doing exercises at home. We can order more sessions if needed.  - Will refill Ultracet 37.5/325mg BID PRN pain x 3 months, which she feels is controlling her pain very well. She tries to skip doses due to fear of becoming dependent. She also alternates compound cream, which is also very helpful for her.  - Will request records from Dr. Stone. She will follow-up with him again soon, as she missed her appt.  - [place]  reviewed, last filled tramadol on 3-6-17. Also filled [place] 5mg #12 from dentist on 3-23-17. She is very low risk for abuse/misuse.  RTC in 3 months for med refill. I discussed the risks, benefits, and alternatives to potential treatment options. All questions and concerns were fully addressed today in clinic. Dr. Springer was consulted regarding the patient plan and agrees.            >>Pain Disability Questionnaire:   1/16/2017 :: 72    >>Opioid Risk Tool:  1/16/2017 :: 0    >> Pain Disability Index:  4/10/2017 :: 5

## 2017-04-26 RX ORDER — POTASSIUM CHLORIDE 750 MG/1
TABLET, EXTENDED RELEASE ORAL
Qty: 30 TABLET | Refills: 6 | Status: SHIPPED | OUTPATIENT
Start: 2017-04-26 | End: 2017-11-20 | Stop reason: SDUPTHER

## 2017-05-09 ENCOUNTER — TELEPHONE (OUTPATIENT)
Dept: PAIN MEDICINE | Facility: CLINIC | Age: 67
End: 2017-05-09

## 2017-05-09 DIAGNOSIS — I10 ESSENTIAL HYPERTENSION: Primary | ICD-10-CM

## 2017-05-09 RX ORDER — DICLOFENAC SODIUM 10 MG/G
2 GEL TOPICAL DAILY
Qty: 3 TUBE | Refills: 0 | Status: SHIPPED | OUTPATIENT
Start: 2017-05-09 | End: 2017-06-28 | Stop reason: SDUPTHER

## 2017-05-09 NOTE — TELEPHONE ENCOUNTER
----- Message from More Ryan PA-C sent at 5/9/2017 12:56 PM CDT -----  Contact: pt  We never prescribed this, but I just sent it in for her.    ----- Message -----     From: Tete Ellis MA     Sent: 5/9/2017   7:11 AM       To: More Ryan PA-C        ----- Message -----     From: Felecia Umaña     Sent: 5/8/2017   4:09 PM       To: Shelby ADAMS Staff    Pt request refill or new prescription for  diclofenac sod topical gel. Pt saw Manuela on 04/10/17.  Pt can be reached at [phone] (itcb)           ..  [facility]  [phone]

## 2017-05-12 ENCOUNTER — TELEPHONE (OUTPATIENT)
Dept: RADIOLOGY | Facility: HOSPITAL | Age: 67
End: 2017-05-12

## 2017-05-15 ENCOUNTER — HOSPITAL ENCOUNTER (OUTPATIENT)
Dept: RADIOLOGY | Facility: HOSPITAL | Age: 67
Discharge: HOME | End: 2017-05-15
Attending: FAMILY MEDICINE
Payer: MEDICARE

## 2017-05-15 DIAGNOSIS — N28.1 CYST OF RIGHT KIDNEY: ICD-10-CM

## 2017-05-15 PROCEDURE — 76770 US EXAM ABDO BACK WALL COMP: CPT | Mod: TC,PO

## 2017-05-15 PROCEDURE — 76770 US EXAM ABDO BACK WALL COMP: CPT | Mod: 26,,, | Performed by: RADIOLOGY

## 2017-05-22 ENCOUNTER — OFFICE VISIT (OUTPATIENT)
Dept: INTERNAL MEDICINE | Facility: CLINIC | Age: 67
End: 2017-05-22
Payer: MEDICARE

## 2017-05-22 VITALS
WEIGHT: 226.19 LBS | SYSTOLIC BLOOD PRESSURE: 122 MMHG | HEIGHT: 62 IN | BODY MASS INDEX: 41.62 KG/M2 | TEMPERATURE: 98 F | DIASTOLIC BLOOD PRESSURE: 78 MMHG

## 2017-05-22 DIAGNOSIS — R53.83 FATIGUE, UNSPECIFIED TYPE: ICD-10-CM

## 2017-05-22 DIAGNOSIS — N28.1 CYST OF RIGHT KIDNEY: Primary | ICD-10-CM

## 2017-05-22 DIAGNOSIS — R41.3 MEMORY LOSS: ICD-10-CM

## 2017-05-22 DIAGNOSIS — R43.9 SENSE OF SMELL ALTERED: ICD-10-CM

## 2017-05-22 DIAGNOSIS — I10 ESSENTIAL HYPERTENSION: ICD-10-CM

## 2017-05-22 PROCEDURE — 99214 OFFICE O/P EST MOD 30 MIN: CPT | Mod: S$GLB,,, | Performed by: FAMILY MEDICINE

## 2017-05-22 PROCEDURE — 1160F RVW MEDS BY RX/DR IN RCRD: CPT | Mod: S$GLB,,, | Performed by: FAMILY MEDICINE

## 2017-05-22 PROCEDURE — 3074F SYST BP LT 130 MM HG: CPT | Mod: S$GLB,,, | Performed by: FAMILY MEDICINE

## 2017-05-22 PROCEDURE — 99999 PR PBB SHADOW E&M-EST. PATIENT-LVL III: CPT | Mod: PBBFAC,,, | Performed by: FAMILY MEDICINE

## 2017-05-22 PROCEDURE — 3078F DIAST BP <80 MM HG: CPT | Mod: S$GLB,,, | Performed by: FAMILY MEDICINE

## 2017-05-22 PROCEDURE — 99499 UNLISTED E&M SERVICE: CPT | Mod: S$PBB,,, | Performed by: FAMILY MEDICINE

## 2017-05-22 RX ORDER — AMOXICILLIN 500 MG
1 CAPSULE ORAL 3 TIMES DAILY
COMMUNITY
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
## 2017-06-02 ENCOUNTER — TELEPHONE (OUTPATIENT)
Dept: RADIOLOGY | Facility: HOSPITAL | Age: 67
End: 2017-06-02

## 2017-06-03 ENCOUNTER — OFFICE VISIT (OUTPATIENT)
Dept: URGENT CARE | Facility: CLINIC | Age: 67
End: 2017-06-03
Payer: MEDICARE

## 2017-06-03 VITALS
HEIGHT: 62 IN | TEMPERATURE: 98 F | WEIGHT: 228.38 LBS | SYSTOLIC BLOOD PRESSURE: 134 MMHG | DIASTOLIC BLOOD PRESSURE: 90 MMHG | BODY MASS INDEX: 42.03 KG/M2 | HEART RATE: 84 BPM

## 2017-06-03 DIAGNOSIS — L20.9 ATOPIC DERMATITIS, UNSPECIFIED TYPE: Primary | ICD-10-CM

## 2017-06-03 PROCEDURE — 99999 PR PBB SHADOW E&M-EST. PATIENT-LVL III: CPT | Mod: PBBFAC,,, | Performed by: PHYSICIAN ASSISTANT

## 2017-06-03 PROCEDURE — 99499 UNLISTED E&M SERVICE: CPT | Mod: S$PBB,,, | Performed by: PHYSICIAN ASSISTANT

## 2017-06-03 PROCEDURE — 99214 OFFICE O/P EST MOD 30 MIN: CPT | Mod: S$PBB,,, | Performed by: PHYSICIAN ASSISTANT

## 2017-06-03 RX ORDER — TRIAMCINOLONE ACETONIDE 1 MG/G
CREAM TOPICAL 2 TIMES DAILY
Qty: 1 TUBE | Refills: 0 | Status: SHIPPED | OUTPATIENT
Start: 2017-06-03 | End: 2019-05-08

## 2017-06-03 NOTE — PROGRESS NOTES
"Subjective:       Patient ID: Judi Brown is a 66 y.o. female.    Chief Complaint: Recurrent Skin Infections (left leg )    Rash   This is a recurrent problem. The current episode started in the past 7 days (gets this rash "every year" ). The problem has been gradually worsening since onset. The affected locations include the left lower leg. The rash is characterized by itchiness. She was exposed to nothing. Pertinent negatives include no cough, eye pain, fatigue, fever, rhinorrhea, shortness of breath, sore throat or vomiting. Treatments tried: over the counter hydrocortisone and a "psoriasis cream" The treatment provided no relief. Her past medical history is significant for allergies.     Review of Systems   Constitutional: Negative for chills, fatigue and fever.   HENT: Negative for rhinorrhea and sore throat.    Eyes: Negative for pain and redness.   Respiratory: Negative for cough and shortness of breath.    Cardiovascular: Negative for chest pain and leg swelling.   Gastrointestinal: Negative for abdominal pain, nausea and vomiting.   Musculoskeletal: Negative for myalgias.   Skin: Positive for rash.   Neurological: Negative for headaches.       Objective:      BP (!) 134/90 (BP Location: Left arm, Patient Position: Sitting, BP Method: Manual)   Pulse 84   Temp 97.6 °F (36.4 °C) (Tympanic)   Ht 5' 2" (1.575 m)   Wt 103.6 kg (228 lb 6.3 oz)   BMI 41.77 kg/m²   Physical Exam   Constitutional: She is oriented to person, place, and time. She appears well-developed and well-nourished. No distress.   HENT:   Head: Normocephalic.   Right Ear: External ear normal.   Left Ear: External ear normal.   Nose: Nose normal.   Eyes: Conjunctivae and EOM are normal. Right eye exhibits no discharge. Left eye exhibits no discharge.   Neck: Normal range of motion. Neck supple.   Musculoskeletal:        Neurological: She is alert and oriented to person, place, and time. She has normal reflexes. She displays normal " reflexes. Coordination normal.   Skin: Skin is warm and dry. Rash noted. Rash is papular (numerous hypopigmented papules to anterior RLL with severe excoriations and abrasions from scratching, no induration/erythema/drainage; no burrows). She is not diaphoretic. There is erythema.   Vitals reviewed.      Assessment:       1. Atopic dermatitis, unspecified type        Plan:       Atopic dermatitis, unspecified type  -     mineral oil-hydrophil petrolat (AQUAPHOR) Oint; Apply topically as needed.  -     triamcinolone acetonide 0.1% (KENALOG) 0.1 % cream; Apply topically 2 (two) times daily. May use up to 2 times a day. Do not use on face  Dispense: 1 Tube; Refill: 0    Emphasized moisturizers and kenalog cream. Avoid scratching to prevent secondary bacterial infection.    Use Aquaphor or Eucerin cream twice a day to keep skin moisturized.  Apply Kenalog cream twice a day until rash is resolved.  Follow up with your PCP in 1 week if no improvement in rash or immediately if worsening rash.    Heather Trant PA-C [facility]

## 2017-06-03 NOTE — PATIENT INSTRUCTIONS
Atopic Dermatitis (Adult)  Atopic dermatitis is a dry, itchy, red rash. Its also called eczema. The rash is chronic, or ongoing. It can come and go over time. The disease is often passed down in families. It causes a problem with the skin barrier that makes the skin more sensitive to the environment and other factors. The increased skin sensitivity causes an itch, which causes scratching. Scratching can worsen the itching or also break the skin. This can put the skin at risk of infection.  The condition is most common in people with asthma, hay fever, hives, or dry or sensitive skin. The rash may be caused by extreme heat or heavy sweating. Skin irritants can cause the rash to flare up. These can include wool or silk clothing, grease, oils, some medicines, and harsh soaps and detergents. Emotional stress can also be a trigger.  Treatment is done to relieve the itching and inflammation of the skin.  Home care  Follow these tips to care for your condition:  · Keep the areas of rash clean by bathing at least every other day. Use lukewarm water to bathe. Dont use hot water, which can dry out the skin.  · Dont use soaps with strong detergents. Use mild soaps made for sensitive skin.  · Apply a cream or ointment to damp skin right after bathing.  · Avoid things that irritate your skin. Wear absorbent, soft fabrics next to the skin rather than rough or scratchy materials.  · Use mild laundry soap free of scents and perfumes. Make sure to rinse all the soap out of your clothes.  · Treat any skin infection as directed.  · Use oral diphenhydramine to help reduce itching. This is an antihistamine you can buy at drug and grocery stores. It can make you sleepy, so use lower doses during the daytime. Or you can use loratadine. This is an antihistamine that will not make you sleepy. Do not use diphenhydramine if you have glaucoma or have trouble urinating due to an enlarged prostate.  Follow-up care  See your healthcare  provider, or as advised. If your symptoms dont get better or if they get worse in the next 7 days, make an appointment with your healthcare provider.  When to seek medical advice  Call your healthcare provider right away  if any of these occur:  · Increasing area of redness or pain in the skin  · Yellow crusts or wet drainage from the rash  · Fever of 100.4°F (38°C) or higher, or as directed by your healthcare provider  Date Last Reviewed: 9/1/2016 © 2000-2016 [employer]. [address] [place] [id number]. All rights reserved. This information is not intended as a substitute for professional medical care. Always follow your healthcare professional's instructions.      Use Aquaphor or Eucerin cream twice a day to keep skin moisturized.  Apply Kenalog cream twice a day until rash is resolved.  Follow up with your PCP in 1 week if no improvement in rash or immediately if worsening rash.

## 2017-06-05 ENCOUNTER — HOSPITAL ENCOUNTER (OUTPATIENT)
Dept: RADIOLOGY | Facility: HOSPITAL | Age: 67
Discharge: HOME | End: 2017-06-05
Attending: FAMILY MEDICINE
Payer: MEDICARE

## 2017-06-05 DIAGNOSIS — R41.3 MEMORY LOSS: ICD-10-CM

## 2017-06-05 DIAGNOSIS — R43.9 SENSE OF SMELL ALTERED: ICD-10-CM

## 2017-06-05 PROCEDURE — 70551 MRI BRAIN STEM W/O DYE: CPT | Mod: 26,,, | Performed by: RADIOLOGY

## 2017-06-05 PROCEDURE — 70551 MRI BRAIN STEM W/O DYE: CPT | Mod: TC,PO

## 2017-06-28 ENCOUNTER — OFFICE VISIT (OUTPATIENT)
Dept: PAIN MEDICINE | Facility: CLINIC | Age: 67
End: 2017-06-28
Payer: MEDICARE

## 2017-06-28 VITALS
SYSTOLIC BLOOD PRESSURE: 127 MMHG | DIASTOLIC BLOOD PRESSURE: 77 MMHG | BODY MASS INDEX: 41.96 KG/M2 | RESPIRATION RATE: 18 BRPM | HEIGHT: 62 IN | HEART RATE: 80 BPM | WEIGHT: 228 LBS

## 2017-06-28 DIAGNOSIS — M47.817 SPONDYLOSIS OF LUMBOSACRAL REGION WITHOUT MYELOPATHY OR RADICULOPATHY: Primary | ICD-10-CM

## 2017-06-28 DIAGNOSIS — M47.812 SPONDYLOSIS OF CERVICAL REGION WITHOUT MYELOPATHY OR RADICULOPATHY: ICD-10-CM

## 2017-06-28 DIAGNOSIS — M54.16 LEFT LUMBAR RADICULOPATHY: ICD-10-CM

## 2017-06-28 DIAGNOSIS — G95.89 MYELOMALACIA: ICD-10-CM

## 2017-06-28 DIAGNOSIS — M51.36 DDD (DEGENERATIVE DISC DISEASE), LUMBAR: ICD-10-CM

## 2017-06-28 PROCEDURE — 99999 PR PBB SHADOW E&M-EST. PATIENT-LVL IV: CPT | Mod: PBBFAC,,, | Performed by: PHYSICIAN ASSISTANT

## 2017-06-28 PROCEDURE — 99214 OFFICE O/P EST MOD 30 MIN: CPT | Mod: S$GLB,,, | Performed by: PHYSICIAN ASSISTANT

## 2017-06-28 PROCEDURE — 99499 UNLISTED E&M SERVICE: CPT | Mod: S$PBB,,, | Performed by: PHYSICIAN ASSISTANT

## 2017-06-28 RX ORDER — DICLOFENAC SODIUM 10 MG/G
GEL TOPICAL
Qty: 5 TUBE | Refills: 2 | Status: SHIPPED | OUTPATIENT
Start: 2017-06-28 | End: 2017-11-29 | Stop reason: SDUPTHER

## 2017-06-28 RX ORDER — TRAMADOL HYDROCHLORIDE AND ACETAMINOPHEN 37.5; 325 MG/1; MG/1
1 TABLET, FILM COATED ORAL 2 TIMES DAILY PRN
Qty: 60 TABLET | Refills: 2 | Status: SHIPPED | OUTPATIENT
Start: 2017-06-28 | End: 2017-07-28

## 2017-06-28 NOTE — PROGRESS NOTES
Chief Pain Complaint:  Low Back Pain, Mid Back Pain, Left Leg Pain  Bilateral Shoulder Pain      History of Present Illness:  This patient is a 66 y.o. female who presents today complaining of the above noted pain/s. The patient describes this pain as follows.    - duration of pain: pain for years, worse since stopping the mobic  - timing: intermittent   - character: aching, numbing  - radiating, dermatomal: extends into left leg along L5  - antecedent trauma, prior spinal surgery: , no prior spinal surgery   - pertinent negatives: No fever, No chills, No weight loss, No bladder dysfunction, No bowel dysfunction, No extremity weakness, No saddle anesthesia  - pertinent positives: none    - medications, other therapies tried (physical therapy, injections):     >> Medications: Mobic (helped but had to d/c due to gastric irritation), Ultracet (helps), compound cream (helps)    >> Has previously undergone Physical Therapy, which was very helpful    >> Has NOT previously undergone spinal injection/s      IMAGING / Labs / Studies (reviewed on 6/28/2017):    12/19/16 Lumbar MRI wo contrast  Technique: Standard noncontrast multiplanar multisequence imaging of the lumbar spine was performed.  Findings: There is anatomic spinal alignment.  Degenerative vertebral endplate spurring, disc space narrowing, and distal desiccation present at multiple levels, most pronounced at T11-T12, L2-L3, L3-L4, and L4-L5.  Discogenic subendplate marrow signal changes also present at multiple levels.  Distal cord terminates at L1.  Paravertebral soft tissues are symmetric and normal in appearance.  Several right renal cysts measuring up to 2.2 cm.  T10-T11: Visible on sagittal sequences only.  Broad-based posterior disc bulge.  Bilateral foraminal narrowing.  T11-T12: Visible on sagittal sequences only.  Central disc bulge.  Bilateral foraminal narrowing.  T2/STIR signal hyperintensity within the cord substance suggesting myelomalacia.  T12-L1:  Visible on sagittal sequences only.  Unremarkable.  L1-L2: Broad-based disc bulge with left foraminal/extraforaminal disc protrusion.  Bilateral facet arthropathy and ligamentum flavum hypertrophy contributing to lateral recess encroachment.  Moderate left greater than right foraminal narrowing.  No significant canal stenosis.  L2-L3: Broad-based disc bulge with left greater than right foraminal/extraforaminal disc protrusions and posterior endplate lipping.  Facet arthropathy and ligamentum flavum hypertrophy.  Marked foraminal narrowing greater on the right.  Bilateral lateral recess encroachment.  L3-L4: Broad-based disc bulge, posterior endplate lipping, hypertrophic facet arthropathy, and ligamentum flavum hypertrophy.  Marked foraminal narrowing greater on the right.  Moderate to marked central canal stenosis with minimum AP canal diameter 7.1 mm.  L4-L5: Posterior disc bulge and endplate lipping with hypertrophic facet arthropathy and ligamentum flavum hypertrophy.  Marked marked bilateral foraminal narrowing and central canal stenosis and minimal AP canal diameter 9.4 mm.  L5-S1: Mild posterior disc bulge with left paracentral/foraminal asymmetry and hypertrophic facet arthropathy.  Moderate left and mild right foraminal narrowing.  No sitting canal stenosis.       9/13/12 X-Ray Lumbar Spine Complete 5 View    Narrative DATE OF EXAM: Sep 13 2012   Findings: Vertebral alignment is normal.  There is mild narrowing of the   disk spaces and early anterior osteophyte formation.  There are no   compression fractures or acute abnormalities are seen.       2/11/13 X-Ray Lumbar Spine Ap And Lateral    Narrative DATE OF EXAM: Feb 11 2013   Findings:   As compared with 9/13/12, no significant interim change is identified.         Review of Systems:  CONSTITUTIONAL: patient denies any fever, chills, or weight loss  SKIN: patient denies any rash or itching  RESPIRATORY: patient denies having any shortness of  "breath  GASTROINTESTINAL: patient denies having any diarrhea, constipation, or bowel incontinence  GENITOURINARY: patient denies having any abnormal bladder function    MUSCULOSKELETAL:  - patient complains of the above noted pain/s (see chief pain complaint)    NEUROLOGICAL:   - pain as above  - strength in Lower extremities is intact, BILATERALLY  - sensation in Upper extremities is intact, BILATERALLY  - patient denies any loss of bowel or bladder control      PSYCHIATRIC: patient denies any change in mood      Physical Exam:    Vitals:  /77 (BP Location: Right arm, Patient Position: Sitting, BP Method: Automatic)   Pulse 80   Resp 18   Ht 5' 2" (1.575 m)   Wt 103.4 kg (228 lb)   BMI 41.70 kg/m²    (reviewed on 6/28/2017)    General: alert and oriented, in no apparent distress, overweight  Gait: normal gait  Skin: No rashes, No discoloration, No obvious lesions  HEENT: EOMI  Respiratory: respirations nonlabored    Musculoskeletal:  - Any pain on flexion, extension, rotation:    >> pain on extension and rotation    >> facet loading causes pain bilaterally  - Straight Leg Raise:     >> LEFT :: negative    >> RIGHT :: negative  - Any tenderness to palpation across paraspinal muscles, joints, bursae:     >> across b/l GTB, mild    Neuro:  - Extremity Strength:     >> LEFT :: 5/5    >> RIGHT :: 5/5  - Extremity Reflexes:    >> LEFT  :: 2+    >> RIGHT :: 2+  - Sensory (sensation to light touch):    >> LEFT :: intact    >> RIGHT :: intact     Psych:  Mood and affect is appropriate      Assessment:  Lumbar Spondylosis  Lumbar Degenerative Disc Disease  Lumbar radiculopathy  Myelomalacia  Bilateral shoulder OA      Plan:  Patient presents for follow-up. She is complaining of widespread joint pain which became manifest after she discontinued Mobic. This was controlling her pain, but she had to d/c due to side effects of gastric irritation, which was evident on recent EGD. Lumbar MRI shows myelomalacia, so she " saw Dr. Stone (neurosurgeon at  [facility]) on 12-28-16 who did not recommend emergent surgery considering she was not having any motor disturbances. This will be a last resort in her treatment plan, and he recommends injections for treatment if her pain worsens or becomes uncontrolled even with medication, which she also wants to wait on.   - Will refill Ultracet 37.5/325mg BID PRN pain and diclofenac gel x 3 months, which she feels is controlling her pain very well. She tries to skip doses due to fear of becoming dependent. She also alternates with diclofenac gel, which is also very helpful for her.  - Will did not get records from Dr. Stone. She will follow-up with him again soon, and she will request records at that time.  RTC in 3 months for med refill. I discussed the risks, benefits, and alternatives to potential treatment options. All questions and concerns were fully addressed today in clinic. Dr. Springer was consulted regarding the patient plan and agrees.            >>Pain Disability Questionnaire:   1/16/2017 :: 72    >> Pain Disability Index:  4/10/2017 :: 5  6/28/2017 :: 24    >>Opioid Risk Tool:  1/16/2017 :: 0

## 2017-10-26 ENCOUNTER — OFFICE VISIT (OUTPATIENT)
Dept: OPHTHALMOLOGY | Facility: CLINIC | Age: 67
End: 2017-10-26
Payer: MEDICARE

## 2017-10-26 DIAGNOSIS — I10 ESSENTIAL HYPERTENSION: ICD-10-CM

## 2017-10-26 DIAGNOSIS — Z83.511 FAMILY HISTORY OF GLAUCOMA: Primary | ICD-10-CM

## 2017-10-26 DIAGNOSIS — H52.203 MYOPIA OF BOTH EYES WITH ASTIGMATISM AND PRESBYOPIA: ICD-10-CM

## 2017-10-26 DIAGNOSIS — H25.013 CORTICAL SENILE CATARACT OF BOTH EYES: ICD-10-CM

## 2017-10-26 DIAGNOSIS — H52.4 MYOPIA OF BOTH EYES WITH ASTIGMATISM AND PRESBYOPIA: ICD-10-CM

## 2017-10-26 DIAGNOSIS — H52.13 MYOPIA OF BOTH EYES WITH ASTIGMATISM AND PRESBYOPIA: ICD-10-CM

## 2017-10-26 PROCEDURE — 99999 PR PBB SHADOW E&M-EST. PATIENT-LVL I: CPT | Mod: PBBFAC,,, | Performed by: OPTOMETRIST

## 2017-10-26 PROCEDURE — 92014 COMPRE OPH EXAM EST PT 1/>: CPT | Mod: S$GLB,,, | Performed by: OPTOMETRIST

## 2017-10-26 PROCEDURE — 92015 DETERMINE REFRACTIVE STATE: CPT | Mod: S$GLB,,, | Performed by: OPTOMETRIST

## 2017-10-26 PROCEDURE — 99499 UNLISTED E&M SERVICE: CPT | Mod: S$PBB,,, | Performed by: OPTOMETRIST

## 2017-10-26 NOTE — PROGRESS NOTES
"HPI     Yearly eye exam to check the dry eyes.      1. Dry Eyes  2. HTN  3. Fhx of glaucoma (brother)    AT"s [facility] OU  Patanol prn OU    Last edited by Debora Pressley, OD on 10/26/2017 10:32 AM. (History)            Assessment /Plan     For exam results, see Encounter Report.    Family history of glaucoma    Cortical senile cataract of both eyes    Essential hypertension    Myopia of both eyes with astigmatism and presbyopia    Glaucoma screening and fundus exam normal.  No hypertensive retinopathy.  Cataract(s) not yet affecting activities of daily living, therefore, surgery consult is not yet indicated.  Updated glasses prescription.  Return to clinic 1 yr.                     "

## 2017-11-07 ENCOUNTER — PATIENT OUTREACH (OUTPATIENT)
Dept: ADMINISTRATIVE | Facility: HOSPITAL | Age: 67
End: 2017-11-07

## 2017-11-07 DIAGNOSIS — Z12.39 ENCOUNTER FOR SPECIAL SCREENING EXAMINATION FOR NEOPLASM OF BREAST: Primary | ICD-10-CM

## 2017-11-10 ENCOUNTER — TELEPHONE (OUTPATIENT)
Dept: RADIOLOGY | Facility: HOSPITAL | Age: 67
End: 2017-11-10

## 2017-11-13 ENCOUNTER — HOSPITAL ENCOUNTER (OUTPATIENT)
Dept: RADIOLOGY | Facility: HOSPITAL | Age: 67
Discharge: HOME | End: 2017-11-13
Attending: FAMILY MEDICINE
Payer: MEDICARE

## 2017-11-13 DIAGNOSIS — N28.1 CYST OF RIGHT KIDNEY: ICD-10-CM

## 2017-11-13 PROCEDURE — 76770 US EXAM ABDO BACK WALL COMP: CPT | Mod: TC,PO

## 2017-11-13 PROCEDURE — 76770 US EXAM ABDO BACK WALL COMP: CPT | Mod: 26,,, | Performed by: RADIOLOGY

## 2017-11-16 ENCOUNTER — LAB VISIT (OUTPATIENT)
Dept: LAB | Facility: HOSPITAL | Age: 67
End: 2017-11-16
Attending: FAMILY MEDICINE
Payer: MEDICARE

## 2017-11-16 DIAGNOSIS — N28.1 CYST OF RIGHT KIDNEY: ICD-10-CM

## 2017-11-16 DIAGNOSIS — N28.1 CYST OF RIGHT KIDNEY: Primary | ICD-10-CM

## 2017-11-16 LAB
BILIRUB UR QL STRIP: NEGATIVE
CLARITY UR REFRACT.AUTO: CLEAR
COLOR UR AUTO: YELLOW
GLUCOSE UR QL STRIP: NEGATIVE
HGB UR QL STRIP: NEGATIVE
KETONES UR QL STRIP: NEGATIVE
LEUKOCYTE ESTERASE UR QL STRIP: NEGATIVE
NITRITE UR QL STRIP: NEGATIVE
PH UR STRIP: 6 [PH] (ref 5–8)
PROT UR QL STRIP: NEGATIVE
SP GR UR STRIP: 1.01 (ref 1–1.03)
URN SPEC COLLECT METH UR: NORMAL
UROBILINOGEN UR STRIP-ACNC: NEGATIVE EU/DL

## 2017-11-16 PROCEDURE — 81003 URINALYSIS AUTO W/O SCOPE: CPT

## 2017-11-20 ENCOUNTER — OFFICE VISIT (OUTPATIENT)
Dept: INTERNAL MEDICINE | Facility: CLINIC | Age: 67
End: 2017-11-20
Payer: MEDICARE

## 2017-11-20 VITALS
SYSTOLIC BLOOD PRESSURE: 132 MMHG | TEMPERATURE: 98 F | DIASTOLIC BLOOD PRESSURE: 80 MMHG | HEART RATE: 76 BPM | BODY MASS INDEX: 42.19 KG/M2 | HEIGHT: 62 IN | WEIGHT: 229.25 LBS

## 2017-11-20 DIAGNOSIS — E55.9 VITAMIN D DEFICIENCY: ICD-10-CM

## 2017-11-20 DIAGNOSIS — I10 HYPERTENSION, UNSPECIFIED TYPE: ICD-10-CM

## 2017-11-20 DIAGNOSIS — E83.52 HYPERCALCEMIA: ICD-10-CM

## 2017-11-20 DIAGNOSIS — E66.01 MORBID OBESITY: ICD-10-CM

## 2017-11-20 DIAGNOSIS — E87.6 HYPOKALEMIA: ICD-10-CM

## 2017-11-20 DIAGNOSIS — N28.1 CYST OF RIGHT KIDNEY: Primary | ICD-10-CM

## 2017-11-20 DIAGNOSIS — R73.09 ABNORMAL GLUCOSE: ICD-10-CM

## 2017-11-20 PROCEDURE — 99999 PR PBB SHADOW E&M-EST. PATIENT-LVL III: CPT | Mod: PBBFAC,,, | Performed by: FAMILY MEDICINE

## 2017-11-20 PROCEDURE — 90662 IIV NO PRSV INCREASED AG IM: CPT | Mod: S$GLB,,, | Performed by: FAMILY MEDICINE

## 2017-11-20 PROCEDURE — 90732 PPSV23 VACC 2 YRS+ SUBQ/IM: CPT | Mod: S$GLB,,, | Performed by: FAMILY MEDICINE

## 2017-11-20 PROCEDURE — G0009 ADMIN PNEUMOCOCCAL VACCINE: HCPCS | Mod: S$GLB,,, | Performed by: FAMILY MEDICINE

## 2017-11-20 PROCEDURE — 99499 UNLISTED E&M SERVICE: CPT | Mod: S$PBB,,, | Performed by: FAMILY MEDICINE

## 2017-11-20 PROCEDURE — 99214 OFFICE O/P EST MOD 30 MIN: CPT | Mod: S$GLB,,, | Performed by: FAMILY MEDICINE

## 2017-11-20 PROCEDURE — G0008 ADMIN INFLUENZA VIRUS VAC: HCPCS | Mod: S$GLB,,, | Performed by: FAMILY MEDICINE

## 2017-11-20 RX ORDER — INDAPAMIDE 2.5 MG/1
2.5 TABLET ORAL DAILY
Qty: 30 TABLET | Refills: 6 | Status: SHIPPED | OUTPATIENT
Start: 2017-11-20 | End: 2018-05-24 | Stop reason: SDUPTHER

## 2017-11-20 RX ORDER — TRAMADOL HYDROCHLORIDE AND ACETAMINOPHEN 37.5; 325 MG/1; MG/1
TABLET, FILM COATED ORAL
COMMUNITY
Start: 2017-11-06 | End: 2017-11-20

## 2017-11-20 RX ORDER — FLUTICASONE PROPIONATE 50 MCG
2 SPRAY, SUSPENSION (ML) NASAL DAILY PRN
Qty: 1 BOTTLE | Refills: 3 | Status: SHIPPED | OUTPATIENT
Start: 2017-11-20 | End: 2018-05-24 | Stop reason: SDUPTHER

## 2017-11-20 RX ORDER — ERGOCALCIFEROL 1.25 MG/1
50000 CAPSULE ORAL
Qty: 12 CAPSULE | Refills: 1 | Status: SHIPPED | OUTPATIENT
Start: 2017-11-20 | End: 2018-05-24 | Stop reason: SDUPTHER

## 2017-11-20 RX ORDER — POTASSIUM CHLORIDE 750 MG/1
10 TABLET, EXTENDED RELEASE ORAL DAILY
Qty: 30 TABLET | Refills: 6 | Status: SHIPPED | OUTPATIENT
Start: 2017-11-20 | End: 2018-05-24 | Stop reason: SDUPTHER

## 2017-11-20 NOTE — PROGRESS NOTES
"Subjective:      Patient ID: Judi Brown is a 67 y.o. female.    Chief Complaint:  F/U kidney US    HPI  67 female here for f/u on kidney US.  She has been doing well.  No dysuria/hematuria.  No flank pain.   Overall since our last visit, feeling good.  Stressors have decreased and this has helped a good bit.  Last visit she reported smelling cinnamon at times, she still does. MRI of brain was normal. Does have chronic sinus issues.    Weight is up 3 lbs.  Trying to exercise for her OA b/c pain meds do not do well with her.    BP is stable, taking her potassium.  No CP/SOB. Bowels moving well.    Past Medical History:   Diagnosis Date    Allergy     Arthritis     Cyst of right kidney     Hypertension      Family History   Problem Relation Age of Onset    Hypothyroidism Mother     Hypertension Sister     Hypertension Brother     Glaucoma Brother     Blindness Cousin     Diabetes Cousin     Cancer Cousin     Cataracts Cousin     Hypertension Son     Hypertension Other     Breast cancer Neg Hx     Ovarian cancer Neg Hx     Deep vein thrombosis Neg Hx     Macular degeneration Neg Hx     Retinal detachment Neg Hx     Strabismus Neg Hx     Thyroid disease Neg Hx      Past Surgical History:   Procedure Laterality Date    BREAST SURGERY      biospy    BUNIONECTOMY      bilateral    COLONOSCOPY N/A 11/16/2016    Procedure: COLONOSCOPY;  Surgeon: Cj Kruger III, MD;  Location: [facility];  Service: Endoscopy;  Laterality: N/A;    fatty tumor removal      lt shoulder    JOINT REPLACEMENT      TONSILLECTOMY      TOTAL KNEE ARTHROPLASTY      bilateral    TUBAL LIGATION       Social History   Substance Use Topics    Smoking status: Never Smoker    Smokeless tobacco: Never Used    Alcohol use No       /80   Pulse 76   Temp 98.3 °F (36.8 °C)   Ht 5' 2" (1.575 m)   Wt 104 kg (229 lb 4.5 oz)   BMI 41.94 kg/m²     Review of Systems   Constitutional: Negative for activity change, " appetite change, chills, diaphoresis, fatigue, fever and unexpected weight change.   HENT: Negative for ear pain, hearing loss, postnasal drip, rhinorrhea and tinnitus.    Eyes: Negative for visual disturbance.   Respiratory: Negative for cough, shortness of breath and wheezing.    Cardiovascular: Negative for chest pain, palpitations and leg swelling.   Gastrointestinal: Negative for abdominal distention.   Endocrine: Negative for polyuria.   Genitourinary: Negative for dysuria, frequency, hematuria and urgency.   Musculoskeletal: Positive for arthralgias.   Skin: Negative for color change and rash.   Neurological: Negative for weakness and headaches.   Psychiatric/Behavioral: Negative for confusion and decreased concentration.     Objective:     Physical Exam   Constitutional: She is oriented to person, place, and time. She appears well-developed and well-nourished. No distress.   HENT:   Right Ear: External ear normal.   Left Ear: External ear normal.   Nose: Nose normal.   Mouth/Throat: Oropharynx is clear and moist.   Eyes: Pupils are equal, round, and reactive to light.   Neck: Normal range of motion. Neck supple.   Cardiovascular: Normal rate, regular rhythm and normal heart sounds.    Pulmonary/Chest: Effort normal and breath sounds normal. No respiratory distress. She has no wheezes. She has no rales.   Abdominal: Soft. Bowel sounds are normal. She exhibits no distension. There is no tenderness.   Musculoskeletal: She exhibits no edema.   Neurological: She is alert and oriented to person, place, and time. No cranial nerve deficit.   Skin: Skin is warm and dry. No rash noted.   Psychiatric: She has a normal mood and affect. Her behavior is normal. Judgment and thought content normal.   Nursing note and vitals reviewed.      Lab Results   Component Value Date    WBC 4.64 05/15/2017    HGB 13.1 05/15/2017    HCT 40.9 05/15/2017     05/15/2017    CHOL 175 05/15/2017    TRIG 88 05/15/2017    HDL 46  05/15/2017    ALT 27 05/15/2017    AST 22 05/15/2017     11/13/2017    K 4.3 11/13/2017     11/13/2017    CREATININE 1.0 11/13/2017    BUN 12 11/13/2017    CO2 30 (H) 11/13/2017    TSH 1.131 04/07/2017    GLUF 89 04/30/2008    HGBA1C 5.1 10/13/2016       Assessment:     1. Cyst of right kidney    2. Hypokalemia    3. Hypercalcemia    4. Hypertension, unspecified type    5. Vitamin D deficiency    6. Abnormal glucose    7. Morbid obesity       Plan:   Cyst of right kidney  -     Urinalysis; Future; Expected date: 05/20/2018  -     US Retroperitoneal Complete (Kidney and; Future; Expected date: 11/20/2018    Hypokalemia  -     Comprehensive metabolic panel; Future; Expected date: 05/20/2018    Hypercalcemia  -     Comprehensive metabolic panel; Future; Expected date: 05/20/2018    Hypertension, unspecified type  -     CBC auto differential; Future; Expected date: 05/20/2018  -     Comprehensive metabolic panel; Future; Expected date: 05/20/2018  -     TSH; Future; Expected date: 05/20/2018  -     Lipid panel; Future; Expected date: 05/20/2018    Vitamin D deficiency  -     Vitamin D; Future; Expected date: 05/20/2018    Abnormal glucose  -     Hemoglobin A1c; Future; Expected date: 05/20/2018    Morbid obesity    Other orders  -     (In Office Administered) Pneumococcal Polysaccharide Vaccine (23 Valent) (SQ/IM)  -     Influenza - High Dose (65+) (PF) (IM)    BP stable, cont current meds  Potassium/Calcium stable cont to monitor.  Cont potassium  Focus on weight loss, better eating/exercise  Renal cyst stable, no change in size.  UA normal. Cr/GFR normal.  Will monitor  Flu shot and pneumo 23 today  F/u 6 mos with labs

## 2017-11-28 RX ORDER — TRAMADOL HYDROCHLORIDE AND ACETAMINOPHEN 37.5; 325 MG/1; MG/1
1 TABLET, FILM COATED ORAL 2 TIMES DAILY PRN
Qty: 60 TABLET | Refills: 2 | Status: SHIPPED | OUTPATIENT
Start: 2017-11-28 | End: 2017-12-28

## 2017-11-29 ENCOUNTER — OFFICE VISIT (OUTPATIENT)
Dept: PAIN MEDICINE | Facility: CLINIC | Age: 67
End: 2017-11-29
Payer: MEDICARE

## 2017-11-29 VITALS
SYSTOLIC BLOOD PRESSURE: 134 MMHG | RESPIRATION RATE: 14 BRPM | WEIGHT: 229 LBS | HEART RATE: 91 BPM | BODY MASS INDEX: 42.14 KG/M2 | HEIGHT: 62 IN | DIASTOLIC BLOOD PRESSURE: 73 MMHG

## 2017-11-29 DIAGNOSIS — M19.012 PRIMARY OSTEOARTHRITIS OF BOTH SHOULDERS: ICD-10-CM

## 2017-11-29 DIAGNOSIS — M51.36 DDD (DEGENERATIVE DISC DISEASE), LUMBAR: ICD-10-CM

## 2017-11-29 DIAGNOSIS — M54.16 LEFT LUMBAR RADICULOPATHY: ICD-10-CM

## 2017-11-29 DIAGNOSIS — M47.817 SPONDYLOSIS OF LUMBOSACRAL REGION WITHOUT MYELOPATHY OR RADICULOPATHY: Primary | ICD-10-CM

## 2017-11-29 DIAGNOSIS — G95.89 MYELOMALACIA: ICD-10-CM

## 2017-11-29 DIAGNOSIS — M47.812 SPONDYLOSIS OF CERVICAL REGION WITHOUT MYELOPATHY OR RADICULOPATHY: ICD-10-CM

## 2017-11-29 DIAGNOSIS — M19.011 PRIMARY OSTEOARTHRITIS OF BOTH SHOULDERS: ICD-10-CM

## 2017-11-29 PROCEDURE — 99214 OFFICE O/P EST MOD 30 MIN: CPT | Mod: S$GLB,,, | Performed by: PHYSICIAN ASSISTANT

## 2017-11-29 PROCEDURE — 99999 PR PBB SHADOW E&M-EST. PATIENT-LVL IV: CPT | Mod: PBBFAC,,, | Performed by: PHYSICIAN ASSISTANT

## 2017-11-29 PROCEDURE — 99499 UNLISTED E&M SERVICE: CPT | Mod: S$PBB,,, | Performed by: PHYSICIAN ASSISTANT

## 2017-11-29 RX ORDER — DICLOFENAC SODIUM 10 MG/G
GEL TOPICAL
Qty: 5 TUBE | Refills: 2 | Status: SHIPPED | OUTPATIENT
Start: 2017-11-29 | End: 2020-09-21

## 2017-11-29 NOTE — PROGRESS NOTES
Chief Pain Complaint:  Low Back Pain, Mid Back Pain, Left Leg Pain  Bilateral Shoulder Pain      History of Present Illness:  This patient is a 67 y.o. female who presents today complaining of the above noted pain/s. The patient describes this pain as follows.    - duration of pain: pain for years, worse since stopping the mobic  - timing: intermittent   - character: aching, numbing  - radiating, dermatomal: extends into left leg along L5 at times  - antecedent trauma, prior spinal surgery: , no prior spinal surgery   - pertinent negatives: No fever, No chills, No weight loss, No bladder dysfunction, No bowel dysfunction, No extremity weakness, No saddle anesthesia  - pertinent positives: none    - medications, other therapies tried (physical therapy, injections):     >> Medications: Mobic (helped but had to d/c due to gastric irritation), Ultracet (helps), compound cream (helps)    >> Has previously undergone Physical Therapy, which was very helpful    >> Has NOT previously undergone spinal injection/s      IMAGING / Labs / Studies (reviewed on 11/29/2017):    12/19/16 Lumbar MRI wo contrast  Technique: Standard noncontrast multiplanar multisequence imaging of the lumbar spine was performed.  Findings: There is anatomic spinal alignment.  Degenerative vertebral endplate spurring, disc space narrowing, and distal desiccation present at multiple levels, most pronounced at T11-T12, L2-L3, L3-L4, and L4-L5.  Discogenic subendplate marrow signal changes also present at multiple levels.  Distal cord terminates at L1.  Paravertebral soft tissues are symmetric and normal in appearance.  Several right renal cysts measuring up to 2.2 cm.  T10-T11: Visible on sagittal sequences only.  Broad-based posterior disc bulge.  Bilateral foraminal narrowing.  T11-T12: Visible on sagittal sequences only.  Central disc bulge.  Bilateral foraminal narrowing.  T2/STIR signal hyperintensity within the cord substance suggesting  myelomalacia.  T12-L1: Visible on sagittal sequences only.  Unremarkable.  L1-L2: Broad-based disc bulge with left foraminal/extraforaminal disc protrusion.  Bilateral facet arthropathy and ligamentum flavum hypertrophy contributing to lateral recess encroachment.  Moderate left greater than right foraminal narrowing.  No significant canal stenosis.  L2-L3: Broad-based disc bulge with left greater than right foraminal/extraforaminal disc protrusions and posterior endplate lipping.  Facet arthropathy and ligamentum flavum hypertrophy.  Marked foraminal narrowing greater on the right.  Bilateral lateral recess encroachment.  L3-L4: Broad-based disc bulge, posterior endplate lipping, hypertrophic facet arthropathy, and ligamentum flavum hypertrophy.  Marked foraminal narrowing greater on the right.  Moderate to marked central canal stenosis with minimum AP canal diameter 7.1 mm.  L4-L5: Posterior disc bulge and endplate lipping with hypertrophic facet arthropathy and ligamentum flavum hypertrophy.  Marked marked bilateral foraminal narrowing and central canal stenosis and minimal AP canal diameter 9.4 mm.  L5-S1: Mild posterior disc bulge with left paracentral/foraminal asymmetry and hypertrophic facet arthropathy.  Moderate left and mild right foraminal narrowing.  No sitting canal stenosis.       9/13/12 X-Ray Lumbar Spine Complete 5 View    Narrative DATE OF EXAM: Sep 13 2012   Findings: Vertebral alignment is normal.  There is mild narrowing of the   disk spaces and early anterior osteophyte formation.  There are no   compression fractures or acute abnormalities are seen.       2/11/13 X-Ray Lumbar Spine Ap And Lateral    Narrative DATE OF EXAM: Feb 11 2013   Findings:   As compared with 9/13/12, no significant interim change is identified.         Review of Systems:  CONSTITUTIONAL: patient denies any fever, chills, or weight loss  SKIN: patient denies any rash or itching  RESPIRATORY: patient denies having any  "shortness of breath  GASTROINTESTINAL: patient denies having any diarrhea, constipation, or bowel incontinence  GENITOURINARY: patient denies having any abnormal bladder function    MUSCULOSKELETAL:  - patient complains of the above noted pain/s (see chief pain complaint)    NEUROLOGICAL:   - pain as above  - strength in Lower extremities is intact, BILATERALLY  - sensation in Upper extremities is intact, BILATERALLY  - patient denies any loss of bowel or bladder control      PSYCHIATRIC: patient denies any change in mood      Physical Exam:  Vitals:  /73   Pulse 91   Resp 14   Ht 5' 2" (1.575 m)   Wt 103.9 kg (229 lb)   BMI 41.88 kg/m²    (reviewed on 11/29/2017)    General: alert and oriented, in no apparent distress, morbidly obese  Gait: normal gait  Skin: No rashes, No discoloration, No obvious lesions  HEENT: EOMI  Respiratory: respirations nonlabored    Musculoskeletal:  - Any pain on flexion, extension, rotation:    >> pain on extension and rotation  - Straight Leg Raise:     >> LEFT :: negative    >> RIGHT :: negative  - Any tenderness to palpation across paraspinal muscles, joints, bursae:     >> across bilateral GTB, mild    Neuro:  - Extremity Strength:     >> LEFT :: 5/5    >> RIGHT :: 5/5  - Extremity Reflexes:    >> LEFT  :: 2+    >> RIGHT :: 2+  - Sensory (sensation to light touch):    >> LEFT :: intact    >> RIGHT :: intact     Psych:  Mood and affect is appropriate        Assessment:  Lumbar Spondylosis  Lumbar Degenerative Disc Disease  Lumbar radiculopathy  Myelomalacia  Bilateral shoulder OA      Plan:  Patient presents for follow-up. She is complaining of widespread joint pain which became manifest after she discontinued Mobic. This was controlling her pain, but she had to d/c due to side effects of gastric irritation, which was evident on recent EGD. Lumbar MRI shows myelomalacia, so she [facility] Dr[facility] on 12-28-16 who did not recommend emergent " surgery considering she was not having any motor disturbances. This will be a last resort in her treatment plan, and he recommends injections for treatment if her pain worsens or becomes uncontrolled even with medication, which she has wanted to wait on.   - Will refill Ultracet 37.5/325mg BID PRN pain and diclofenac gel x 3 months, which she feels is controlling her pain very well. She tries to skip doses of tramadol due to fear of becoming dependent and very sensitive stomach, and she uses diclofenac in between. Her Rx of tramadol usually lasts longer than a month.  RTC in 4 months for med refill. I discussed the risks, benefits, and alternatives to potential treatment options. All questions and concerns were fully addressed today in clinic. Dr. Springer was consulted regarding the patient plan and agrees.            >> Pain Disability Index:  4/10/2017 :: 5  6/28/2017 :: 24    >>Opioid Risk Tool:  1/16/2017 :: 0

## 2018-03-27 RX ORDER — TRAMADOL HYDROCHLORIDE AND ACETAMINOPHEN 37.5; 325 MG/1; MG/1
1 TABLET, FILM COATED ORAL 2 TIMES DAILY PRN
Qty: 960 TABLET | Refills: 1 | Status: SHIPPED | OUTPATIENT
Start: 2018-04-02 | End: 2018-04-03

## 2018-03-27 NOTE — TELEPHONE ENCOUNTER
Patient's prescriptions were printed and signed by Dr. Carcamo prior to the patient's clinic appointment.    Patient did not show up for scheduled appt, so paper Rx will be destroyed.

## 2018-04-02 RX ORDER — DICLOFENAC SODIUM 10 MG/G
GEL TOPICAL
Qty: 5 TUBE | Refills: 0 | Status: SHIPPED | OUTPATIENT
Start: 2018-04-02 | End: 2018-05-11 | Stop reason: SDUPTHER

## 2018-05-06 ENCOUNTER — OFFICE VISIT (OUTPATIENT)
Dept: URGENT CARE | Facility: CLINIC | Age: 68
End: 2018-05-06
Payer: MEDICARE

## 2018-05-06 VITALS
TEMPERATURE: 97 F | OXYGEN SATURATION: 98 % | HEIGHT: 62 IN | DIASTOLIC BLOOD PRESSURE: 70 MMHG | SYSTOLIC BLOOD PRESSURE: 118 MMHG | HEART RATE: 80 BPM | WEIGHT: 229.06 LBS | RESPIRATION RATE: 18 BRPM | BODY MASS INDEX: 42.15 KG/M2

## 2018-05-06 DIAGNOSIS — M10.9 ACUTE GOUT OF LEFT FOOT, UNSPECIFIED CAUSE: Primary | ICD-10-CM

## 2018-05-06 PROCEDURE — 99214 OFFICE O/P EST MOD 30 MIN: CPT | Mod: S$GLB,,, | Performed by: FAMILY MEDICINE

## 2018-05-06 PROCEDURE — 99999 PR PBB SHADOW E&M-EST. PATIENT-LVL IV: CPT | Mod: PBBFAC,,, | Performed by: FAMILY MEDICINE

## 2018-05-06 PROCEDURE — 3078F DIAST BP <80 MM HG: CPT | Mod: CPTII,S$GLB,, | Performed by: FAMILY MEDICINE

## 2018-05-06 PROCEDURE — 3074F SYST BP LT 130 MM HG: CPT | Mod: CPTII,S$GLB,, | Performed by: FAMILY MEDICINE

## 2018-05-06 RX ORDER — COLCHICINE 0.6 MG/1
0.6 TABLET ORAL DAILY
Qty: 30 TABLET | Refills: 0 | Status: SHIPPED | OUTPATIENT
Start: 2018-05-06 | End: 2018-06-20 | Stop reason: SDUPTHER

## 2018-05-06 NOTE — PROGRESS NOTES
Subjective:       Patient ID: Judi Brown is a 67 y.o. female.    Chief Complaint: Gout    Gout flare to left foot:  O: 5/1/18  L:left foot  D:constant  C: Aching      Review of Systems   Respiratory: Negative for shortness of breath.    Cardiovascular: Negative for chest pain.   Gastrointestinal: Negative for abdominal pain.       Objective:      Physical Exam   Constitutional: She appears well-developed and well-nourished. She appears distressed.   HENT:   Head: Normocephalic and atraumatic.   Nose: Nose normal.   Mouth/Throat: Oropharynx is clear and moist.   Pulmonary/Chest: Effort normal and breath sounds normal. No respiratory distress. She has no wheezes.   Musculoskeletal:   Left foot TTP, most prominently at left great toe   Skin: Skin is warm and dry. No rash noted. She is not diaphoretic. No erythema.   Nursing note and vitals reviewed.      Assessment:       1. Acute gout of left foot, unspecified cause        Plan:     Problem List Items Addressed This Visit        Orthopedic    Acute gout of foot - Primary    Relevant Medications    colchicine 0.6 mg tablet

## 2018-05-06 NOTE — PATIENT INSTRUCTIONS
Gout    Gout is an inflammation of a joint due to a build-up of gout crystals in the joint fluid. This occurs when there is an excess of uric acid (a normal waste product) in the body. Uric acid builds up in the body when the kidneys are unable to filter enough of it from the blood. This may occur with age. It is also associated with kidney disease. Gout occurs more often in people with obesity, diabetes, high blood pressure, or high levels of fats in the blood. It may run in families. Gout tends to come and go. A flare up of gout is called an attack. Drinking alcohol or eating certain foods (such as shellfish or foods with additives such as high-fructose corn syrup) may increase uric acid levels in the blood and cause a gout attack.  During a gout attack, the affected joint may become a hot, red, swollen and painful. If you have had one attack of gout, you are likely to have another. An attack of gout can be treated with medicine. If these attacks become frequent, a daily medicine may be prescribed to help the kidneys remove uric acid from the body.  Home care  During a gout attack:  · Rest painful joints. If gout affects the joints of your foot or leg, you may want to use crutches for the first few days to keep from bearing weight on the affected joint.  · When sitting or lying down, raise the painful joint to a level higher than your heart.  · Apply an ice pack (ice cubes in a plastic bag wrapped in a thin towel) over the injured area for 20 minutes every 1 to 2 hours the first day for pain relief. Continue this 3 to 4 times a day for swelling and pain.  · Avoid alcohol and foods listed below (see Preventing attacks) during a gout attack. Drink extra fluid to help flush the uric acid through your kidneys.  · If you were prescribed a medicine to treat gout, take it as your healthcare provider has instructed. Don't skip doses.  · Take anti-inflammatory medicine as directed.   · If pain medicines have been  prescribed, take them exactly as directed.    Preventing attacks  · Minimize or avoid alcohol use. Excess alcohol intake can cause a gout attack.  · Limit these foods and beverages:  ¨ Organ meats, such as kidneys and liver  ¨ Certain seafoods (anchovies, sardines, shrimp, scallops, herring, mackerel)  ¨ Wild game, meat extracts and meat gravies  ¨ Foods and beverages sweetened with high-fructose corn syrup, such as sodas  · Eat a healthy diet including low-fat and nonfat dairy, whole grains, and vegetables.  · If you are overweight, talk to your healthcare provider about a weight reduction plan. Avoid fasting or extreme low calorie diets (less than 900 calories per day). This will increase uric acid levels in the body.  · If you have diabetes or high blood pressure, work with your doctor to manage these conditions.  · Protect the joint from injury. Trauma can trigger a gout attack.  Follow-up care  Follow up with your healthcare provider, or as advised.   When to seek medical advice  Call your healthcare provider if you have any of the following:  · Fever over 100.4°F (38.ºC) with worsening joint pain  · Increasing redness around the joint  · Pain developing in another joint  · Repeated vomiting, abdominal pain, or blood in the vomit or stool (black or red color)  Date Last Reviewed: 3/1/2017  © 2970-7809 [employer]. [address] [place] [address], [place] [address]. All rights reserved. This information is not intended as a substitute for professional medical care. Always follow your healthcare professional's instructions.        Treating Gout Attacks     Raising the joint above the level of your heart can help reduce gout symptoms.     Gout is a disease that affects the joints. It is caused by excess uric acid in your blood stream that may lead to crystals forming in your joints. Left untreated, it can lead to painful foot and joint deformities and even kidney problems. But, by treating gout early, you can  relieve pain and help prevent future problems. Gout can usually be treated with medication and proper diet. In severe cases, surgery may be needed.  Gout attacks are painful and often happen more than once. Taking medications may reduce pain and prevent attacks in the future. There are also some things you can do at home to relieve symptoms.  Medications for gout  Your healthcare provider may prescribe a daily medication to reduce levels of uric acid. Reducing your uric acid levels may help prevent gout attacks. Allopurinol is one commonly used medication taken daily to reduce uric acid levels. Other medications can help relieve pain and swelling during an acute attack. Medicines such as NSAIDs (nonsteroidal anti-inflammatory medicines), steroids, and colchicine may be prescribed for intermittent use to relieve an acute gout attack. Be sure to take your medication as directed.  What you can do  Below are some things you can do at home to relieve gout symptoms. Your healthcare provider may have other tips.  · Rest the painful joint as much as you can.  · Raise the painful joint so it is at a level higher than your heart.  · Use ice for 10 minutes every 1-2 hours as possible.  How can I prevent gout?  With a little effort, you may be able to prevent gout attacks in the future. Here are some things you can do:  · Avoid foods high in purines  ¨ Certain meats (red meat, processed meat, turkey)  ¨ Organ meats (kidney, liver, sweetbread)  ¨ Shellfish (lobster, crab, shrimp, scallop, mussel)  ¨ Certain fish (anchovy, sardine, herring, mackerel)  · Take any medications prescribed by your healthcare provider.  · Lose weight if you need to.  · Reduce high fructose corn syrup in meals and drinks.  · Reduce or eliminate consumption of alcohol, particularly beer, but also red wine and spirits.  · Control blood pressure, diabetes, and cholesterol.  · Drink plenty of water to help flush uric acid from your body.  Date Last  Reviewed: 2/1/2016  © 6681-8867 [employer] [address] [place] [address], [place] [address]. All rights reserved. This information is not intended as a substitute for professional medical care. Always follow your healthcare professional's instructions.        Gout Diet  Gout is a painful condition caused by an excess of uric acid, a waste product made by the body. Uric acid forms crystals that collect in the joints. The immune response to these crystals brings on symptoms of joint pain and swelling. This is called a gout attack. Often, medications and diet changes are combined to manage gout. Below are some guidelines for changing your diet to help you manage gout and prevent attacks. Your health care provider will help you determine the best eating plan for you.     Eating to manage gout  Weight loss for those who are overweight may help reduce gout attacks.  Eat less of these foods  Eating too many foods containing purines may raise the levels of uric acid in your body. This raises your risk for a gout attack. Try to limit these foods and drinks:  · Alcohol, such as beer and red wine. You may be told to avoid alcohol completely.  · Soft drinks that contain sugar or high fructose corn syrup  · Certain fish, including anchovies, sardines, fish eggs, and herring  · Shellfish  · Certain meats, such as red meat, hot dogs, luncheon meats, and turkey  · Organ meats, such as liver, kidneys, and sweetbreads  · Legumes, such as dried beans and peas  · Other high fat foods such as gravy, whole milk, and high fat cheeses  · Vegetables such as asparagus, cauliflower, spinach, and mushrooms used to be thought to contribute to an increased risk for a gout attack, but recent studies show that high purine vegetables don't increase the risk for a gout attack.  Eat more of these foods  Other foods may be helpful for people with gout. Add some of these foods to your diet:  · Cherries contain chemicals that may lower uric  acid.  · Omega fatty acids. These are found in some fatty fish such as salmon, certain oils (flax, olive, or nut), and nuts themselves. Omega fatty acids may help prevent inflammation due to gout.  · Dairy products that are low-fat or fat-free, such as cheese and yogurt  · Complex carbohydrate foods, including whole grains, brown rice, oats, and beans  · Coffee, in moderation  · Water, approximately 64 ounces per day  Follow-up care  Follow up with your healthcare provider as advised.  When to seek medical advice  Call your healthcare provider right away if any of these occur:  · Return of gout symptoms, usually at night:  · Severe pain, swelling, and heat in a joint, especially the base of the big toe  · Affected joint is hard to move  · Skin of the affected joint is purple or red  · Fever of 100.4°F (38°C) or higher  · Pain that doesn't get better even with prescribed medicine   Date Last Reviewed: 1/12/2016  © 9180-8164 [employer]. [address] [place] [address], [place] [address]. All rights reserved. This information is not intended as a substitute for professional medical care. Always follow your healthcare professional's instructions.

## 2018-05-07 ENCOUNTER — TELEPHONE (OUTPATIENT)
Dept: INTERNAL MEDICINE | Facility: CLINIC | Age: 68
End: 2018-05-07

## 2018-05-07 NOTE — TELEPHONE ENCOUNTER
----- Message from Luba Crystal sent at 5/7/2018  3:04 PM CDT -----  Contact: pt  States she was seen on Sunday for Gout and she would like to get the prescription for the pain medicine. Pt uses     [facility] - [place] - [address] [facility]  [address] [place] [address]  Phone: [phone] Fax: [phone]    Please call pt at [phone]. Thank you

## 2018-05-07 NOTE — TELEPHONE ENCOUNTER
Spoke with pt and she said she picked up the gout medication but wants the pain medication as well. She said  had mentioned the pain med might have a side effect of nausea and she thinks her answer might have prevented him from sending it but that she would rather be nauseas then in the pain she is in. I told her I would send him the msg and get back with her. Pt verbalized understanding.

## 2018-05-07 NOTE — TELEPHONE ENCOUNTER
Spoke with pt and informed her per  he only intended on sending her the colchicine. No narcotic medication for this issue.   Discussed follow up with her pcp to assess uric acid levels, and further treatment.   That I asked what she should take for the pain and he said She is allergic to naprosyn.   Indocin would be a good choice, but she has many allergies to nsaids   She should make an appt with her pcp. Pt verbalized understanding.

## 2018-05-11 ENCOUNTER — OFFICE VISIT (OUTPATIENT)
Dept: INTERNAL MEDICINE | Facility: CLINIC | Age: 68
End: 2018-05-11
Payer: MEDICARE

## 2018-05-11 ENCOUNTER — HOSPITAL ENCOUNTER (OUTPATIENT)
Dept: RADIOLOGY | Facility: HOSPITAL | Age: 68
Discharge: HOME | End: 2018-05-11
Attending: FAMILY MEDICINE
Payer: MEDICARE

## 2018-05-11 VITALS
HEIGHT: 61 IN | DIASTOLIC BLOOD PRESSURE: 64 MMHG | BODY MASS INDEX: 40.12 KG/M2 | WEIGHT: 212.5 LBS | TEMPERATURE: 98 F | SYSTOLIC BLOOD PRESSURE: 112 MMHG | HEART RATE: 80 BPM

## 2018-05-11 DIAGNOSIS — R50.9 FEVER, UNSPECIFIED FEVER CAUSE: ICD-10-CM

## 2018-05-11 DIAGNOSIS — Z12.39 BREAST CANCER SCREENING: ICD-10-CM

## 2018-05-11 DIAGNOSIS — R63.1 POLYDIPSIA: Primary | ICD-10-CM

## 2018-05-11 DIAGNOSIS — E66.01 MORBID OBESITY: ICD-10-CM

## 2018-05-11 DIAGNOSIS — R73.09 ABNORMAL GLUCOSE: ICD-10-CM

## 2018-05-11 DIAGNOSIS — R11.0 NAUSEA: ICD-10-CM

## 2018-05-11 DIAGNOSIS — R63.4 WEIGHT LOSS: ICD-10-CM

## 2018-05-11 DIAGNOSIS — I10 HYPERTENSION, UNSPECIFIED TYPE: ICD-10-CM

## 2018-05-11 DIAGNOSIS — M10.9 GOUT, UNSPECIFIED CAUSE, UNSPECIFIED CHRONICITY, UNSPECIFIED SITE: ICD-10-CM

## 2018-05-11 PROCEDURE — 99214 OFFICE O/P EST MOD 30 MIN: CPT | Mod: S$GLB,,, | Performed by: FAMILY MEDICINE

## 2018-05-11 PROCEDURE — 99999 PR PBB SHADOW E&M-EST. PATIENT-LVL III: CPT | Mod: PBBFAC,,, | Performed by: FAMILY MEDICINE

## 2018-05-11 PROCEDURE — 99499 UNLISTED E&M SERVICE: CPT | Mod: S$GLB,,, | Performed by: FAMILY MEDICINE

## 2018-05-11 PROCEDURE — 71046 X-RAY EXAM CHEST 2 VIEWS: CPT | Mod: 26,,, | Performed by: RADIOLOGY

## 2018-05-11 PROCEDURE — 71046 X-RAY EXAM CHEST 2 VIEWS: CPT | Mod: TC,FY,PO

## 2018-05-11 PROCEDURE — 3074F SYST BP LT 130 MM HG: CPT | Mod: CPTII,S$GLB,, | Performed by: FAMILY MEDICINE

## 2018-05-11 PROCEDURE — 3078F DIAST BP <80 MM HG: CPT | Mod: CPTII,S$GLB,, | Performed by: FAMILY MEDICINE

## 2018-05-11 RX ORDER — TRAMADOL HYDROCHLORIDE AND ACETAMINOPHEN 37.5; 325 MG/1; MG/1
1 TABLET, FILM COATED ORAL 2 TIMES DAILY
COMMUNITY
Start: 2018-04-29 | End: 2018-05-24 | Stop reason: SDUPTHER

## 2018-05-11 NOTE — PROGRESS NOTES
Subjective:      Patient ID: Judi Brown is a 67 y.o. female.    Chief Complaint: Gout    HPI  68 yo female with OA, HTN here for gout f/u.  Seen in UC over the weekend and put on once daily colchicine.  This is helping, but foot/L great toe still with some tenderness.  Swelling improving slowly.    Having cramping in her muscles/using potassium supplements and also eating more oral potassium.  She is on daily fluid pill.  She reports that her weight has dropped off really over past several weeks.  She has some walking/exercise, but her appetite is decreased and she has some nausea off/on.  She has not tried to lose that much weight lately.  Bowels are moving normal.  No vomiting, no abd pain.  No cough/respiratory symptoms.  She states that she had fever prior to the gout flare up.    Joint pains, otherwise no other pain.  Preventive care up to date except for Mammo.    Past Medical History:   Diagnosis Date    Allergy     Arthritis     Cyst of right kidney     Hypertension      Family History   Problem Relation Age of Onset    Hypothyroidism Mother     Hypertension Sister     Hypertension Brother     Glaucoma Brother     Blindness Cousin     Diabetes Cousin     Cancer Cousin     Cataracts Cousin     Hypertension Son     Hypertension Other     Breast cancer Neg Hx     Ovarian cancer Neg Hx     Deep vein thrombosis Neg Hx     Macular degeneration Neg Hx     Retinal detachment Neg Hx     Strabismus Neg Hx     Thyroid disease Neg Hx      Past Surgical History:   Procedure Laterality Date    BREAST SURGERY      biospy    BUNIONECTOMY      bilateral    COLONOSCOPY N/A 11/16/2016    Procedure: COLONOSCOPY;  Surgeon: Cj Kruger III, MD;  Location: [facility];  Service: Endoscopy;  Laterality: N/A;    fatty tumor removal      lt shoulder    JOINT REPLACEMENT      TONSILLECTOMY      TOTAL KNEE ARTHROPLASTY      bilateral    TUBAL LIGATION       Social History   Substance Use Topics     "Smoking status: Never Smoker    Smokeless tobacco: Never Used    Alcohol use No       /64   Pulse 80   Temp 98.4 °F (36.9 °C) (Tympanic)   Ht 5' 1" (1.549 m)   Wt 96.4 kg (212 lb 8.4 oz)   BMI 40.16 kg/m²     Review of Systems   Constitutional: Positive for activity change, appetite change, fatigue, fever and unexpected weight change.   Gastrointestinal: Positive for nausea.   Musculoskeletal: Positive for arthralgias.       Objective:     Physical Exam   Constitutional: She is oriented to person, place, and time. She appears well-developed and well-nourished. No distress.   HENT:   Mouth/Throat: Oropharynx is clear and moist.   Eyes: Conjunctivae are normal. Pupils are equal, round, and reactive to light.   Neck: Normal range of motion. Neck supple. Carotid bruit is not present. No thyromegaly present.   Cardiovascular: Normal rate, regular rhythm and normal heart sounds.    Pulmonary/Chest: Effort normal and breath sounds normal. No respiratory distress. She has no wheezes. She has no rales.   Abdominal: Soft. Bowel sounds are normal. She exhibits no distension. There is no tenderness. There is no guarding.   Musculoskeletal: She exhibits deformity. She exhibits no edema.   Left great toe with some joint changes.  Minimal tenderness now, no erythema.  Mildly puffy   Neurological: She is alert and oriented to person, place, and time. No cranial nerve deficit.   Skin: Skin is warm and dry. No rash noted.   Psychiatric: She has a normal mood and affect. Her behavior is normal. Judgment and thought content normal.   Nursing note and vitals reviewed.      Lab Results   Component Value Date    WBC 4.64 05/15/2017    HGB 13.1 05/15/2017    HCT 40.9 05/15/2017     05/15/2017    CHOL 175 05/15/2017    TRIG 88 05/15/2017    HDL 46 05/15/2017    ALT 27 05/15/2017    AST 22 05/15/2017     11/13/2017    K 4.3 11/13/2017     11/13/2017    CREATININE 1.0 11/13/2017    BUN 12 11/13/2017    CO2 30 " (H) 11/13/2017    TSH 1.131 04/07/2017    GLUF 89 04/30/2008    HGBA1C 5.1 10/13/2016       Assessment:     1. Polydipsia    2. Hypertension, unspecified type    3. Abnormal glucose    4. Morbid obesity    5. Weight loss    6. Gout, unspecified cause, unspecified chronicity, unspecified site    7. Breast cancer screening    8. Fever, unspecified fever cause    9. Nausea         Plan:     Polydipsia    Hypertension, unspecified type    Abnormal glucose    Morbid obesity    Weight loss  -     Lipase; Future; Expected date: 05/11/2018  -     Amylase; Future; Expected date: 05/11/2018  -     X-Ray Chest PA And Lateral; Future; Expected date: 05/11/2018    Gout, unspecified cause, unspecified chronicity, unspecified site  -     Uric acid; Future; Expected date: 05/11/2018    Breast cancer screening  -     Mammo Digital Screening Bilat with CAD; Future; Expected date: 05/11/2018    Fever, unspecified fever cause    Nausea  -     Lipase; Future; Expected date: 05/11/2018  -     Amylase; Future; Expected date: 05/11/2018    Increase colchicine to bid x 3-4 days.  Labs/UA/CXR/Mammo to be done today.  Add amylase/lipase.  Keep end of month appt to review all the above findings  She has dropped 17 lbs since last visit with me 11/2017.  Unintentional per pt.

## 2018-05-14 ENCOUNTER — TELEPHONE (OUTPATIENT)
Dept: INTERNAL MEDICINE | Facility: CLINIC | Age: 68
End: 2018-05-14

## 2018-05-14 NOTE — TELEPHONE ENCOUNTER
----- Message from Xu Acosta sent at 5/14/2018  9:57 AM CDT -----  Regarding: Lab Client Services  Contact: [phone]  Hi my name is Xu I work in the lab Client Services. We had a problem with some lab work on this patient. If someone from your office could call us at [phone] or ext [phone] that would be great. Anyone in my department can help. Thank You

## 2018-05-14 NOTE — TELEPHONE ENCOUNTER
Spoke with Susan in Client Services, she said, pt's blood was grossly hemolyzed. She will need to be re drawn for lipase, amylase, cmp and lipids.

## 2018-05-14 NOTE — TELEPHONE ENCOUNTER
----- Message from Xu Acosta sent at 5/14/2018  9:57 AM CDT -----  Regarding: Lab Client Services  Contact: [phone]  Hi my name is Xu I work in the lab Client Services. We had a problem with some lab work on this patient. If someone from your office could call us at [phone] or ext [phone] that would be great. Anyone in my department can help. Thank You

## 2018-05-16 ENCOUNTER — PATIENT OUTREACH (OUTPATIENT)
Dept: ADMINISTRATIVE | Facility: HOSPITAL | Age: 68
End: 2018-05-16

## 2018-05-17 ENCOUNTER — TELEPHONE (OUTPATIENT)
Dept: INTERNAL MEDICINE | Facility: CLINIC | Age: 68
End: 2018-05-17

## 2018-05-17 DIAGNOSIS — E78.5 DYSLIPIDEMIA: Primary | ICD-10-CM

## 2018-05-17 DIAGNOSIS — R63.4 WEIGHT LOSS: ICD-10-CM

## 2018-05-17 NOTE — TELEPHONE ENCOUNTER
Amylase, lipase, urid acid  Lipid, cmp patient will come back in the am to have redrawn and advised to fast and drink plenty of water and patient expressed understanding.aa

## 2018-05-17 NOTE — TELEPHONE ENCOUNTER
----- Message from Kasey Thibodeaux sent at 5/17/2018  2:54 PM CDT -----  Contact: Pt  Please give pt a call at ..[phone] (home) she was returning the nurse call.

## 2018-05-18 ENCOUNTER — TELEPHONE (OUTPATIENT)
Dept: INTERNAL MEDICINE | Facility: CLINIC | Age: 68
End: 2018-05-18

## 2018-05-18 ENCOUNTER — LAB VISIT (OUTPATIENT)
Dept: LAB | Facility: HOSPITAL | Age: 68
End: 2018-05-18
Attending: FAMILY MEDICINE
Payer: MEDICARE

## 2018-05-18 DIAGNOSIS — E78.5 DYSLIPIDEMIA: ICD-10-CM

## 2018-05-18 DIAGNOSIS — R63.4 WEIGHT LOSS: ICD-10-CM

## 2018-05-18 LAB
ALBUMIN SERPL BCP-MCNC: 3.3 G/DL
ALP SERPL-CCNC: 51 U/L
ALT SERPL W/O P-5'-P-CCNC: 32 U/L
AMYLASE SERPL-CCNC: 56 U/L
ANION GAP SERPL CALC-SCNC: 10 MMOL/L
AST SERPL-CCNC: 32 U/L
BILIRUB SERPL-MCNC: 0.6 MG/DL
BUN SERPL-MCNC: 13 MG/DL
CALCIUM SERPL-MCNC: 10 MG/DL
CHLORIDE SERPL-SCNC: 104 MMOL/L
CHOLEST SERPL-MCNC: 159 MG/DL
CHOLEST/HDLC SERPL: 3.9 {RATIO}
CO2 SERPL-SCNC: 29 MMOL/L
CREAT SERPL-MCNC: 1.1 MG/DL
EST. GFR  (AFRICAN AMERICAN): >60 ML/MIN/1.73 M^2
EST. GFR  (NON AFRICAN AMERICAN): 52.4 ML/MIN/1.73 M^2
GLUCOSE SERPL-MCNC: 118 MG/DL
HDLC SERPL-MCNC: 41 MG/DL
HDLC SERPL: 25.8 %
LDLC SERPL CALC-MCNC: 100.4 MG/DL
LIPASE SERPL-CCNC: 17 U/L
NONHDLC SERPL-MCNC: 118 MG/DL
POTASSIUM SERPL-SCNC: 4.5 MMOL/L
PROT SERPL-MCNC: 7.3 G/DL
SODIUM SERPL-SCNC: 143 MMOL/L
TRIGL SERPL-MCNC: 88 MG/DL
URATE SERPL-MCNC: 8.9 MG/DL

## 2018-05-18 PROCEDURE — 83690 ASSAY OF LIPASE: CPT

## 2018-05-18 PROCEDURE — 80061 LIPID PANEL: CPT

## 2018-05-18 PROCEDURE — 36415 COLL VENOUS BLD VENIPUNCTURE: CPT | Mod: PO

## 2018-05-18 PROCEDURE — 84550 ASSAY OF BLOOD/URIC ACID: CPT

## 2018-05-18 PROCEDURE — 82150 ASSAY OF AMYLASE: CPT

## 2018-05-18 PROCEDURE — 80053 COMPREHEN METABOLIC PANEL: CPT

## 2018-05-18 NOTE — TELEPHONE ENCOUNTER
Called and let pt know that I scheduled her lab appt for this morning.  She said she is on her way.

## 2018-05-23 NOTE — TELEPHONE ENCOUNTER
Please have pt follow up week after Mammogram.  Ask her to chart her weight weekly until then.  Be sure to eat 3 times daily.

## 2018-05-23 NOTE — TELEPHONE ENCOUNTER
All the labs are looking good, nothing that would explain unintentional weight loss.  Is pt scheduled for mammogram?  I need that updated so I can continue to complete her work up.

## 2018-05-24 RX ORDER — INDAPAMIDE 2.5 MG/1
2.5 TABLET ORAL DAILY
Qty: 30 TABLET | Refills: 6 | Status: SHIPPED | OUTPATIENT
Start: 2018-05-24 | End: 2019-02-16 | Stop reason: SDUPTHER

## 2018-05-24 RX ORDER — ERGOCALCIFEROL 1.25 MG/1
50000 CAPSULE ORAL
Qty: 12 CAPSULE | Refills: 1 | Status: SHIPPED | OUTPATIENT
Start: 2018-05-24 | End: 2019-01-10 | Stop reason: SDUPTHER

## 2018-05-24 RX ORDER — TRAMADOL HYDROCHLORIDE AND ACETAMINOPHEN 37.5; 325 MG/1; MG/1
1 TABLET, FILM COATED ORAL 2 TIMES DAILY PRN
Qty: 60 TABLET | Refills: 3 | Status: SHIPPED | OUTPATIENT
Start: 2018-05-24 | End: 2018-09-10 | Stop reason: SDUPTHER

## 2018-05-24 RX ORDER — POTASSIUM CHLORIDE 750 MG/1
10 TABLET, EXTENDED RELEASE ORAL DAILY
Qty: 30 TABLET | Refills: 6 | Status: SHIPPED | OUTPATIENT
Start: 2018-05-24 | End: 2019-01-17 | Stop reason: SDUPTHER

## 2018-05-24 RX ORDER — FLUTICASONE PROPIONATE 50 MCG
2 SPRAY, SUSPENSION (ML) NASAL DAILY PRN
Qty: 1 BOTTLE | Refills: 3 | Status: SHIPPED | OUTPATIENT
Start: 2018-05-24 | End: 2019-05-08 | Stop reason: SDUPTHER

## 2018-05-24 NOTE — TELEPHONE ENCOUNTER
Pt notified of recommendations. She verbalized understanding. 5/29 appt rescheduled for 7/2. Pt states, she will need refills on all meds. LV 5/11/18, f/u 7/2/18

## 2018-06-20 DIAGNOSIS — M10.9 ACUTE GOUT OF LEFT FOOT, UNSPECIFIED CAUSE: ICD-10-CM

## 2018-06-20 RX ORDER — COLCHICINE 0.6 MG/1
0.6 TABLET ORAL DAILY
Qty: 30 TABLET | Refills: 0 | Status: SHIPPED | OUTPATIENT
Start: 2018-06-20 | End: 2018-08-29 | Stop reason: SDUPTHER

## 2018-06-20 NOTE — TELEPHONE ENCOUNTER
----- Message from Dayan Albarran sent at 6/20/2018  9:35 AM CDT -----  Contact: pt  She's calling stating that she would like her gout medication refilled...    1. What is the name of the medication you are requesting? Colcrys  2. What is the dose? .06 mg  3. How do you take the medication? Orally, topically, etc? orally  4. How often do you take this medication? Once daily  5. Do you need a 30 day or 90 day supply? 30  6. How many refills are you requesting? 1  7. What is your preferred pharmacy and location of the pharmacy? [facility] in [phone]  8. Who can we contact with further questions? [phone] (home)

## 2018-06-25 ENCOUNTER — HOSPITAL ENCOUNTER (OUTPATIENT)
Dept: RADIOLOGY | Facility: HOSPITAL | Age: 68
Discharge: HOME | End: 2018-06-25
Attending: FAMILY MEDICINE
Payer: MEDICARE

## 2018-06-25 VITALS — HEIGHT: 61 IN | BODY MASS INDEX: 40.02 KG/M2 | WEIGHT: 212 LBS

## 2018-06-25 DIAGNOSIS — Z12.39 BREAST CANCER SCREENING: ICD-10-CM

## 2018-06-25 PROCEDURE — 77067 SCR MAMMO BI INCL CAD: CPT | Mod: 26,,, | Performed by: RADIOLOGY

## 2018-06-25 PROCEDURE — 77067 SCR MAMMO BI INCL CAD: CPT | Mod: TC,PO

## 2018-08-29 DIAGNOSIS — M10.9 ACUTE GOUT OF LEFT FOOT, UNSPECIFIED CAUSE: ICD-10-CM

## 2018-08-29 RX ORDER — COLCHICINE 0.6 MG/1
TABLET, FILM COATED ORAL
Qty: 30 TABLET | Refills: 0 | Status: SHIPPED | OUTPATIENT
Start: 2018-08-29 | End: 2019-01-06 | Stop reason: SDUPTHER

## 2018-09-10 ENCOUNTER — OFFICE VISIT (OUTPATIENT)
Dept: URGENT CARE | Facility: CLINIC | Age: 68
End: 2018-09-10
Payer: MEDICARE

## 2018-09-10 ENCOUNTER — HOSPITAL ENCOUNTER (OUTPATIENT)
Dept: RADIOLOGY | Facility: HOSPITAL | Age: 68
Discharge: HOME | End: 2018-09-10
Attending: NURSE PRACTITIONER
Payer: MEDICARE

## 2018-09-10 VITALS
DIASTOLIC BLOOD PRESSURE: 80 MMHG | SYSTOLIC BLOOD PRESSURE: 142 MMHG | TEMPERATURE: 99 F | BODY MASS INDEX: 41.21 KG/M2 | OXYGEN SATURATION: 99 % | WEIGHT: 218.25 LBS | RESPIRATION RATE: 16 BRPM | HEIGHT: 61 IN | HEART RATE: 87 BPM

## 2018-09-10 DIAGNOSIS — M25.511 ACUTE PAIN OF RIGHT SHOULDER: Primary | ICD-10-CM

## 2018-09-10 DIAGNOSIS — M25.511 ACUTE PAIN OF RIGHT SHOULDER: ICD-10-CM

## 2018-09-10 PROCEDURE — 73030 X-RAY EXAM OF SHOULDER: CPT | Mod: TC,FY,PO,RT

## 2018-09-10 PROCEDURE — 1101F PT FALLS ASSESS-DOCD LE1/YR: CPT | Mod: CPTII,,, | Performed by: NURSE PRACTITIONER

## 2018-09-10 PROCEDURE — 3079F DIAST BP 80-89 MM HG: CPT | Mod: CPTII,,, | Performed by: NURSE PRACTITIONER

## 2018-09-10 PROCEDURE — 3077F SYST BP >= 140 MM HG: CPT | Mod: CPTII,,, | Performed by: NURSE PRACTITIONER

## 2018-09-10 PROCEDURE — 99214 OFFICE O/P EST MOD 30 MIN: CPT | Mod: S$PBB,,, | Performed by: NURSE PRACTITIONER

## 2018-09-10 PROCEDURE — 99999 PR PBB SHADOW E&M-EST. PATIENT-LVL V: CPT | Mod: PBBFAC,,, | Performed by: NURSE PRACTITIONER

## 2018-09-10 PROCEDURE — 99215 OFFICE O/P EST HI 40 MIN: CPT | Mod: PBBFAC,25,PO | Performed by: NURSE PRACTITIONER

## 2018-09-10 PROCEDURE — 73030 X-RAY EXAM OF SHOULDER: CPT | Mod: 26,RT,, | Performed by: RADIOLOGY

## 2018-09-10 RX ORDER — TRAMADOL HYDROCHLORIDE AND ACETAMINOPHEN 37.5; 325 MG/1; MG/1
1 TABLET, FILM COATED ORAL 2 TIMES DAILY PRN
Qty: 14 TABLET | Refills: 0 | Status: SHIPPED | OUTPATIENT
Start: 2018-09-10 | End: 2018-09-17

## 2018-09-10 NOTE — PATIENT INSTRUCTIONS
Shoulder Pain with Uncertain Cause  Shoulder pain can have many causes. Pain often comes from the structures that surround the shoulder joint. These are the joint capsule, ligaments, tendons, muscles, and bursa. Pain can also come from cartilage in the joint. Cartilage can become worn out or injured. Its important to know whats causing your pain so the healthcare provider can use the correct treatment. But sometimes its difficult to find the exact cause of shoulder pain. You may need to see a specialist (orthopedist). You may also need special tests such as a CT scan or MRI. The provider may need to use special tools to look inside the joint (arthroscopy).  Shoulder pain can be treated with a sling or a device that keeps your shoulder from moving. You can take an anti-inflammatory medicine such as ibuprofen to ease pain. You may need to do special shoulder exercises. Follow up with a specialist if the pain is severe or doesnt go away after a few weeks.  Home care  Follow these tips when caring for yourself at home:  · If a sling was given to you, leave it in place for the time advised by your healthcare provider. If you arent sure how long to wear it, ask for advice. If the sling becomes loose, adjust it so that your forearm is level with the ground. Your shoulder should feel well supported.  · Put an ice pack on the injured area for 20 minutes every 1 to 2 hours the first day. You can make your own ice pack by putting ice cubes in a plastic bag. Wrap the bag in a thin towel. Continue with ice packs 3 to 4 times a day for the next 2 days. Then use the pack as needed to ease pain and swelling.  · You may use acetaminophen or ibuprofen to control pain, unless another pain medicine was prescribed. If you have chronic liver or kidney disease, talk with your healthcare provider before using these medicines. Also talk with your provider if youve ever had a stomach ulcer or GI bleeding.  · Shoulder pain may seem  worse at night, when there is less to distract you from the pain. If you sleep on your side, try to keep weight off your painful shoulder. Propping pillows behind you may stop you from rolling over onto that shoulder during sleep.   · Shoulder and elbow joints can become stiff if left in a sling for too long. You should start range of motion exercises about 7 to 10 days after the injury. Talk with your provider to find out what type of exercises to do and how soon to start.  · You can take the sling off to shower or bathe.  Follow-up care  Follow up with your healthcare provider if you dont start to get better in the next 5 days.  When to seek medical advice  Call your healthcare provider right away if any of these occur:  · Pain or swelling gets worse or continues for more than a few days  · Your hand or fingers become cold, blue, numb, or tingly  · Large amount of bruising on your shoulder or upper arm  · Difficulty moving your hand or fingers  · Weakness in your hand or fingers  · Your shoulder becomes stiff  · It feels like your shoulder is popping out  · You are less able to do your daily activities  Date Last Reviewed: 10/1/2016  © 5011-6089 [employer]. [address] [place] [address]. All rights reserved. This information is not intended as a substitute for professional medical care. Always follow your healthcare professional's instructions.        RICE     Rest an injury, elevate it, and use ice and compression as directed.   RICE stands for rest, ice, compression, and elevation. These can limit pain and swelling after an injury. RICE may be recommended to help treat fractures, sprains, strains, and bruises or bumps.   Home care  The following explain the details of RICE:  · Rest. Limit the use of the injured body part. This helps prevent further damage to the body part and gives it time to heal. In some cases, you may need a sling, brace, splint, or cast to help keep the body part  still until it has healed.  · Ice. Applying ice right after an injury helps relieve pain and swelling. Wrap a bag of ice in a thin towel. Then, place it over the injured area. Do this for 10 to 15 minutes every 3 to 4 hours. Continue for the next 1 to 3 days or until your symptoms improve. Never put ice directly on your skin or ice an area longer than 15 minutes at a time.  · Compression. Putting pressure on an injury helps reduce swelling and provides support. Wrap the injured area firmly with an elastic bandage/wrap. Make sure not to wrap the bandage too tightly or you will cut off blood flow to the injured area. If your bandage loosens, rewrap it.  · Elevation. Keeping an injury raised above the level of your heart reduces swelling, pain, and throbbing. For instance, if you have a broken leg, it may help to rest your leg on several pillows when sitting or lying down. Try to keep the injured area elevated for at least 2 to 3 hours per day.  Follow-up care  Follow up with your healthcare provider, or as advised.  When to seek medical advice  Call your healthcare provider right away if any of these occur:  · Fever of 100.4°F (38°C) or higher, or as directed by your healthcare provider  · Increased pain or swelling in the injured body part  · Injured body part becomes cold, blue, numb, or tingly  · Signs of infection. These include warmth in the skin, redness, drainage, or bad smell coming from the injured body part.  Date Last Reviewed: 1/18/2016  © 7853-2634 [employer]. [address] [place] [address], [place] [address]. All rights reserved. This information is not intended as a substitute for professional medical care. Always follow your healthcare professional's instructions.

## 2018-09-10 NOTE — PROGRESS NOTES
"Subjective:      Patient ID: Judi Brown is a 67 y.o. female.    Chief Complaint: Shoulder Pain    Ms. Brown presents to Urgent Care today with complaints of right shoulder pain x 3 weeks. Started after edging her yard with a heavy electric edger. She was taking Ultracet and using voltaren gel (both of which she was previously prescribed for her back). Was getting improvement, then lifted on a heavy box 3 or 4 days ago and pain has been pretty severe since that time. She is able to get enough relief to tolerate the pain with the Ultracet and voltaren gel. Out of Ultracet as she last had that filled for her back in April. Pain is constant, non-radiating, and is most severe with lateral abduction and with reaching across the body or behind her. She denies numbness, tingling, or weakness of the arm. No neck pain. No redness, swelling, or fever.       Shoulder Pain    This is a new problem. The current episode started 1 to 4 weeks ago (3 weeks). There has been no history of extremity trauma. The problem occurs constantly.     Review of Systems   Constitutional: Negative.    HENT: Negative.    Respiratory: Negative.    Cardiovascular: Negative.    Gastrointestinal: Negative.    Musculoskeletal: Positive for arthralgias (right shoulder) and back pain (chronic and not worse than baseline). Negative for joint swelling and neck pain.   Skin: Negative.    Neurological: Negative.    Hematological: Negative.        Objective:   BP (!) 142/80 (BP Location: Left arm, Patient Position: Sitting, BP Method: Large (Manual))   Pulse 87   Temp 98.7 °F (37.1 °C) (Tympanic)   Resp 16   Ht 5' 1" (1.549 m)   Wt 99 kg (218 lb 4.1 oz)   SpO2 99%   BMI 41.24 kg/m²   Physical Exam   Constitutional: She is oriented to person, place, and time. She appears well-developed and well-nourished. No distress.   Neck: Normal range of motion. Neck supple.   Cardiovascular: Normal rate.   Pulmonary/Chest: Effort normal. No respiratory distress. "   Musculoskeletal:        Right shoulder: She exhibits decreased range of motion and tenderness. She exhibits no bony tenderness, no swelling, no effusion, no crepitus, no deformity, no laceration, normal pulse and normal strength.        Arms:  Neurological: She is alert and oriented to person, place, and time.   Skin: Skin is warm and dry. She is not diaphoretic.   Nursing note and vitals reviewed.    Assessment:      1. Acute pain of right shoulder       Plan:   Acute pain of right shoulder  -     X-ray Shoulder 2 or More Views Right; Future; Expected date: 09/10/2018  -     Ambulatory referral to Orthopedics  -     tramadol-acetaminophen 37.5-325 mg (ULTRACET) 37.5-325 mg Tab; Take 1 tablet by mouth 2 (two) times daily as needed for Pain.  Dispense: 14 tablet; Refill: 0  -     Ambulatory Referral to Physical/Occupational Therapy    Appears to have evidence of calcific tendonitis on x-ray. Awaiting official report from radiology and will follow up with Ms. Brown when that is available.  Referrals placed for ortho and PT. Both departments will contact Ms. Brown for scheduling.  Instructions, follow up, and supportive care as per AVS.

## 2018-09-11 ENCOUNTER — OFFICE VISIT (OUTPATIENT)
Dept: ORTHOPEDICS | Facility: CLINIC | Age: 68
End: 2018-09-11
Payer: MEDICARE

## 2018-09-11 VITALS
BODY MASS INDEX: 41.16 KG/M2 | HEIGHT: 61 IN | RESPIRATION RATE: 18 BRPM | DIASTOLIC BLOOD PRESSURE: 88 MMHG | SYSTOLIC BLOOD PRESSURE: 145 MMHG | HEART RATE: 88 BPM | WEIGHT: 218 LBS

## 2018-09-11 DIAGNOSIS — M75.21 BICEPS TENDINITIS OF RIGHT SHOULDER: ICD-10-CM

## 2018-09-11 DIAGNOSIS — M75.41 IMPINGEMENT SYNDROME OF RIGHT SHOULDER: ICD-10-CM

## 2018-09-11 DIAGNOSIS — M75.01 CALCIFIC PERIARTHRITIS OF RIGHT SHOULDER: Primary | ICD-10-CM

## 2018-09-11 DIAGNOSIS — M25.511 ACUTE PAIN OF RIGHT SHOULDER: ICD-10-CM

## 2018-09-11 PROCEDURE — 20610 DRAIN/INJ JOINT/BURSA W/O US: CPT | Mod: S$PBB,RT,, | Performed by: PHYSICIAN ASSISTANT

## 2018-09-11 PROCEDURE — 20610 DRAIN/INJ JOINT/BURSA W/O US: CPT | Mod: PBBFAC,PO | Performed by: PHYSICIAN ASSISTANT

## 2018-09-11 PROCEDURE — 99999 PR PBB SHADOW E&M-EST. PATIENT-LVL IV: CPT | Mod: PBBFAC,,, | Performed by: PHYSICIAN ASSISTANT

## 2018-09-11 PROCEDURE — 99214 OFFICE O/P EST MOD 30 MIN: CPT | Mod: PBBFAC,PO | Performed by: PHYSICIAN ASSISTANT

## 2018-09-11 PROCEDURE — 99203 OFFICE O/P NEW LOW 30 MIN: CPT | Mod: 25,S$PBB,, | Performed by: PHYSICIAN ASSISTANT

## 2018-09-11 PROCEDURE — 3079F DIAST BP 80-89 MM HG: CPT | Mod: CPTII,,, | Performed by: PHYSICIAN ASSISTANT

## 2018-09-11 PROCEDURE — 1101F PT FALLS ASSESS-DOCD LE1/YR: CPT | Mod: CPTII,,, | Performed by: PHYSICIAN ASSISTANT

## 2018-09-11 PROCEDURE — 3077F SYST BP >= 140 MM HG: CPT | Mod: CPTII,,, | Performed by: PHYSICIAN ASSISTANT

## 2018-09-11 RX ORDER — METHYLPREDNISOLONE ACETATE 80 MG/ML
80 INJECTION, SUSPENSION INTRA-ARTICULAR; INTRALESIONAL; INTRAMUSCULAR; SOFT TISSUE ONCE
Status: COMPLETED | OUTPATIENT
Start: 2018-09-11 | End: 2018-09-11

## 2018-09-11 RX ADMIN — METHYLPREDNISOLONE ACETATE 80 MG: 80 INJECTION, SUSPENSION INTRALESIONAL; INTRAMUSCULAR; INTRASYNOVIAL; SOFT TISSUE at 01:09

## 2018-09-11 NOTE — LETTER
September 11, 2018      Ayah Herron, NP  [address] [place] [address]           [facility]  [address]  [place] [address]  Phone: [phone]  Fax: [phone]          Patient: Judi Brown   MR Number: [id number]   YOB: 1950   Date of Visit: 9/11/2018       Dear Ayah Herron:    Thank you for referring Judi Brown to me for evaluation. Attached you will find relevant portions of my assessment and plan of care.    If you have questions, please do not hesitate to call me. I look forward to following Judi Brown along with you.    Sincerely,    Shannon Frank PA-C    Enclosure  CC:  No Recipients    If you would like to receive this communication electronically, please contact [email or web address] or [phone] to request more information on [employer] access.    For providers and/or their staff who would like to refer a patient to [facility], please contact us through our one-stop-shop provider referral line, [facility] , at [phone].    If you feel you have received this communication in error or would no longer like to receive these types of communications, please e-mail [email or web address]

## 2018-09-11 NOTE — PROGRESS NOTES
Subjective:      Patient ID: Judi Brown is a 67 y.o. female.    Chief Complaint: Pain of the Right Shoulder      HPI: Judi Brown  is a 67 y.o. female who c/o Pain of the Right Shoulder   for duration of a couple of weeks.  She states that the pain started after she spent sometime edging her yd.  She got to feeling better and was not thinking in picked up on a box of papers 5 days ago.  Ever since then, the pain in the right shoulder has gotten much worse.  At worst, it is 10/10 in severity.  Quality is aching and throbbing.  It is constant.  Alleviating factors include tramadol as well as topical anti-inflammatory cream.  She has seen Dr. her long in the past for her back who previously prescribed tramadol.  Aggravating factors include overhead motion.    Past Medical History:   Diagnosis Date    Allergy     Arthritis     Cyst of right kidney     Hypertension      Past Surgical History:   Procedure Laterality Date    BREAST BIOPSY      BUNIONECTOMY      bilateral    COLONOSCOPY N/A 11/16/2016    Procedure: COLONOSCOPY;  Surgeon: Cj Kruger III, MD;  Location: [facility];  Service: Endoscopy;  Laterality: N/A;    COLONOSCOPY N/A 11/16/2016    Performed by Cj Kruger III, MD at [facility] ENDO    ESOPHAGOGASTRODUODENOSCOPY (EGD) N/A 11/16/2016    Performed by Cj Kruger III, MD at [facility] ENDO    fatty tumor removal      lt shoulder    JOINT REPLACEMENT      TONSILLECTOMY      TOTAL KNEE ARTHROPLASTY      bilateral    TUBAL LIGATION       Family History   Problem Relation Age of Onset    Hypothyroidism Mother     Hypertension Sister     Hypertension Brother     Glaucoma Brother     Blindness Cousin     Diabetes Cousin     Cancer Cousin     Cataracts Cousin     Hypertension Son     Hypertension Other     Breast cancer Maternal Cousin     Breast cancer Maternal Cousin     Ovarian cancer Neg Hx     Deep vein thrombosis Neg Hx     Macular degeneration Neg Hx     Retinal  detachment Neg Hx     Strabismus Neg Hx     Thyroid disease Neg Hx      Social History     Socioeconomic History    Marital status:      Spouse name: Not on file    Number of children: 2    Years of education: Not on file    Highest education level: Not on file   Social Needs    Financial resource strain: Not on file    Food insecurity - worry: Not on file    Food insecurity - inability: Not on file    Transportation needs - medical: Not on file    Transportation needs - non-medical: Not on file   Occupational History    Not on file   Tobacco Use    Smoking status: Never Smoker    Smokeless tobacco: Never Used   Substance and Sexual Activity    Alcohol use: No    Drug use: No    Sexual activity: No     Birth control/protection: None   Other Topics Concern    Not on file   Social History Narrative    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 If you have any questions, ask your nurse or doctor.               CONTINUE taking these medications    benzonatate 200 MG capsule  Commonly known as:  TESSALON  Take 1 capsule (200 mg total) by mouth 3 (three) times daily as needed for Cough.     COLCRYS 0.6 mg tablet  Generic drug:  colchicine  TAKE 1 TABLET BY MOUTH ONCE DAILY     diclofenac sodium 1 % Gel  Apply 2-4 grams to each painful area four times daily. Maximum 32 grams/day.     ergocalciferol 50,000 unit Cap  Commonly known as:  VITAMIN D2  Take 1 capsule (50,000 Units total) by mouth every 7 days.     fish oil-omega-3 fatty acids 300-1,000 mg capsule     fluticasone 50 mcg/actuation nasal spray  Commonly known as:  FLONASE  2 sprays (100 mcg total) by Each Nare route daily as needed for Rhinitis. 2 Aerosol, Spray Nasal Every day     indapamide 2.5 MG Tab  Commonly known as:  LOZOL  Take 1 tablet (2.5 mg total) by mouth once daily.     mineral oil-hydrophil petrolat Oint  Commonly known as:  AQUAPHOR  Apply topically as  needed.     olopatadine 0.1 % ophthalmic solution  Commonly known as:  PATANOL  INSTILL 1 DROP IN EACH EYE TWICE A DAY.     potassium chloride 10 MEQ Tbsr  Commonly known as:  KLOR-CON  Take 1 tablet (10 mEq total) by mouth once daily.     tramadol-acetaminophen 37.5-325 mg 37.5-325 mg Tab  Commonly known as:  ULTRACET  Take 1 tablet by mouth 2 (two) times daily as needed for Pain.     triamcinolone acetonide 0.1% 0.1 % cream  Commonly known as:  KENALOG  Apply topically 2 (two) times daily. May use up to 2 times a day. Do not use on face          Review of patient's allergies indicates:   Allergen Reactions    Aleve  [naproxen sodium]      Other reaction(s): Stomach upset    Cephalexin Nausea And Vomiting    Codeine      Other reaction(s): Vomiting  Other reaction(s): Itching    Demerol  [meperidine]      Other reaction(s): Hallucinations    Dilaudid  [hydromorphone (bulk)]      Other reaction(s): Difficulty breathing    Doxycycline Nausea And Vomiting    Erythromycin      Other reaction(s): Nausea    Hydrocodone-acetaminophen      Other reaction(s): Itching    Ibuprofen      Other reaction(s): Stomach upset    Neurontin [gabapentin] Swelling    Penicillins      Other reaction(s): Rash       Review of Systems   Constitution: Negative for fever.   Cardiovascular: Negative for chest pain.   Respiratory: Negative for cough and shortness of breath.    Skin: Negative for rash.   Musculoskeletal: Positive for joint pain and stiffness. Negative for joint swelling.   Gastrointestinal: Negative for heartburn.   Neurological: Negative for headaches and numbness.         Objective:        General    Nursing note and vitals reviewed.  Constitutional: She is oriented to person, place, and time. She appears well-developed and well-nourished.   HENT:   Head: Normocephalic and atraumatic.   Eyes: EOM are normal.   Cardiovascular: Normal rate and regular rhythm.    Pulmonary/Chest: Effort normal.   Abdominal: Soft.    Neurological: She is alert and oriented to person, place, and time.   Psychiatric: She has a normal mood and affect. Her behavior is normal.         Back (L-Spine & T-Spine) / Neck (C-Spine) Exam     Tenderness   The patient is tender to palpation of the right trapezial.   Right Shoulder Exam     Inspection/Observation   Swelling: absent  Bruising: absent  Scars: absent  Deformity: absent  Scapular Dyskinesia: negative    Tenderness   The patient is tender to palpation of the acromioclavicular joint, greater tuberosity and biceps tendon.    Range of Motion   Active abduction: normal   Passive abduction: normal   Extension: normal   Forward Flexion: normal   Forward Elevation: normal  Adduction: normal  External Rotation 0 degrees: abnormal   Internal rotation 0 degrees: abnormal     Tests & Signs   Cross arm: negative  Drop arm: negative  Clarke test: positive  Impingement: positive  Rotator Cuff Painful Arc/Range: moderate  Active Compression Test (Barnwell's Sign): positive  Speed's Test: positive    Other   Sensation: normal    Left Shoulder Exam     Inspection/Observation   Swelling: absent  Bruising: absent  Scars: absent  Deformity: absent  Scapular Dyskinesia: negative    Range of Motion   Active abduction: normal   Passive abduction: normal   Extension: normal   Forward Flexion: normal   Forward Elevation: normal  Adduction: normal  External Rotation 0 degrees: normal   Internal rotation 0 degrees: normal     Other   Sensation: normal       Muscle Strength   Right Upper Extremity   Shoulder Abduction: 4/5   Shoulder Internal Rotation: 5/5   Shoulder External Rotation: 4/5   Left Upper Extremity  Shoulder Abduction: 5/5   Shoulder Internal Rotation: 5/5   Shoulder External Rotation: 5/5     Vascular Exam     Right Pulses      Radial:                    2+      Left Pulses      Radial:                    2+      Capillary Refill  Right Hand: normal capillary refill  Left Hand: normal capillary refill             Xray:   Right shoulder from today images and report were reviewed today.  I agree with the radiologist's interpretation.  Calcification at the insertion of the rotator cuff tendons is present and is compatible with rotator cuff tendinopathy.  Mild degenerative changes at the AC joint.  Degenerative changes are also noted at the glenohumeral joint with spurring about the inferomedial humeral head.  No fracture or dislocation.  Visualized right upper lung is clear.  Soft tissues are normal.    Assessment:       Encounter Diagnoses   Name Primary?    Calcific periarthritis of right shoulder Yes    Acute pain of right shoulder     Biceps tendinitis of right shoulder     Impingement syndrome of right shoulder           Plan:       Judi was seen today for pain.    Diagnoses and all orders for this visit:    Calcific periarthritis of right shoulder  -     methylPREDNISolone acetate injection 80 mg; Inject 1 mL (80 mg total) into the articular space once.  -     Ambulatory Referral to Physical/Occupational Therapy    Acute pain of right shoulder  -     methylPREDNISolone acetate injection 80 mg; Inject 1 mL (80 mg total) into the articular space once.  -     Ambulatory Referral to Physical/Occupational Therapy    Biceps tendinitis of right shoulder  -     methylPREDNISolone acetate injection 80 mg; Inject 1 mL (80 mg total) into the articular space once.  -     Ambulatory Referral to Physical/Occupational Therapy    Impingement syndrome of right shoulder  -     methylPREDNISolone acetate injection 80 mg; Inject 1 mL (80 mg total) into the articular space once.  -     Ambulatory Referral to Physical/Occupational Therapy    Ms. Lau is a new patient with a new problem in the right shoulder.  We have discussed risks and benefits of a subacromial steroid injection.  She wishes to proceed with that.  She would also benefit from formal physical therapy for range of motion, rotator cuff strengthening, as well as manual  modalities to improve her symptoms.  She has some pain in the trapezial area and would like them to work with her as far as therapeutic massage and dry needling for that.  She is requesting got a [place] [facility].  She has used them previously and is very happy with them. Oral NSAIDs are not tolerated due to history of GI upset.  She will use a topical anti-inflammatory cream that she has on hand.  I will see her back in the office in about 6 weeks.  She verbalizes understanding and agrees.    Follow-up in about 6 weeks (around 10/23/2018).    Right Shoulder Injection Report:  After verbal consent was obtained for right shoulder injection, patient ID, site, and side were verified.  The  right  Shoulder was sterilly prepped in the standard fashion.  A 22-gauge needle was introduced into right subacromial space from the posterior portal approach without complication. The right shoulder was then injected with 20 mg lidocaine plain and 80 mg depomedrol.  A sterile bandaid was applied.  The patient was informed to apply an ice pack approximately 10min once arriving home and not to do anything strenuous for 24hours. She was instructed to call if there were any problems. The patient was discharged in stable condition.    The patient understands, chooses and consents to this plan and accepts all   the risks which include but are not limited to the risks mentioned above.     Disclaimer: This note was prepared using a voice recognition system and is likely to have sound alike errors within the text.

## 2018-09-12 ENCOUNTER — OFFICE VISIT (OUTPATIENT)
Dept: DERMATOLOGY | Facility: CLINIC | Age: 68
End: 2018-09-12
Payer: MEDICARE

## 2018-09-12 DIAGNOSIS — L21.9 SEBORRHEIC DERMATITIS: Primary | ICD-10-CM

## 2018-09-12 DIAGNOSIS — B35.4 TINEA CORPORIS: ICD-10-CM

## 2018-09-12 PROCEDURE — 99999 PR PBB SHADOW E&M-EST. PATIENT-LVL II: CPT | Mod: PBBFAC,,, | Performed by: PHYSICIAN ASSISTANT

## 2018-09-12 PROCEDURE — 99499 UNLISTED E&M SERVICE: CPT | Mod: S$GLB,,, | Performed by: PHYSICIAN ASSISTANT

## 2018-09-12 PROCEDURE — 99212 OFFICE O/P EST SF 10 MIN: CPT | Mod: PBBFAC,PO | Performed by: PHYSICIAN ASSISTANT

## 2018-09-12 PROCEDURE — 99202 OFFICE O/P NEW SF 15 MIN: CPT | Mod: S$PBB,,, | Performed by: PHYSICIAN ASSISTANT

## 2018-09-12 PROCEDURE — 1101F PT FALLS ASSESS-DOCD LE1/YR: CPT | Mod: CPTII,,, | Performed by: PHYSICIAN ASSISTANT

## 2018-09-12 RX ORDER — ECONAZOLE NITRATE 10 MG/G
CREAM TOPICAL 2 TIMES DAILY
Qty: 30 G | Refills: 1 | Status: SHIPPED | OUTPATIENT
Start: 2018-09-12

## 2018-09-12 NOTE — PROGRESS NOTES
History of Present Illness: The patient presents with chief complaint of hair loss  Location: frontal scalp  Duration: 3 months  Signs/Symptoms: dry scalp, + itching of scalp; no growth of hair; + shedding  Prior treatments: no tx    Subjective:       Patient ID:  Judi Brown is a 67 y.o. female who presents for   Chief Complaint   Patient presents with    Hair Loss     c/o hair loss x 3 months    Dry Skin     c/o dry skin x several yrs     HPI    Review of Systems   Constitutional: Negative for fever and chills.   Gastrointestinal: Negative for nausea and vomiting.   Skin: Negative for itching, rash, daily sunscreen use and activity-related sunscreen use.   Hematologic/Lymphatic: Does not bruise/bleed easily.        Objective:    Physical Exam   Constitutional: She appears well-developed and well-nourished. No distress.   Neurological: She is alert and oriented to person, place, and time. She is not disoriented.   Psychiatric: She has a normal mood and affect.   Skin:   Areas Examined (abnormalities noted in diagram):   Scalp / Hair Palpated and Inspected  Head / Face Inspection Performed  Neck Inspection Performed  Chest / Axilla Inspection Performed  Back Inspection Performed  RUE Inspected  LUE Inspection Performed              Diagram Legend     Erythematous scaling macule/papule c/w actinic keratosis       Vascular papule c/w angioma      Pigmented verrucoid papule/plaque c/w seborrheic keratosis      Yellow umbilicated papule c/w sebaceous hyperplasia      Irregularly shaped tan macule c/w lentigo     1-2 mm smooth white papules consistent with Milia      Movable subcutaneous cyst with punctum c/w epidermal inclusion cyst      Subcutaneous movable cyst c/w pilar cyst      Firm pink to brown papule c/w dermatofibroma      Pedunculated fleshy papule(s) c/w skin tag(s)      Evenly pigmented macule c/w junctional nevus     Mildly variegated pigmented, slightly irregular-bordered macule c/w mildly atypical  nevus      Flesh colored to evenly pigmented papule c/w intradermal nevus       Pink pearly papule/plaque c/w basal cell carcinoma      Erythematous hyperkeratotic cursted plaque c/w SCC      Surgical scar with no sign of skin cancer recurrence      Open and closed comedones      Inflammatory papules and pustules      Verrucoid papule consistent consistent with wart     Erythematous eczematous patches and plaques     Dystrophic onycholytic nail with subungual debris c/w onychomycosis     Umbilicated papule    Erythematous-base heme-crusted tan verrucoid plaque consistent with inflamed seborrheic keratosis     Erythematous Silvery Scaling Plaque c/w Psoriasis     See annotation      Assessment / Plan:        Seborrheic dermatitis  Tinea corporis  -     econazole nitrate 1 % cream; Apply topically 2 (two) times daily. AAA of face qd x 2 weeks.  Dispense: 30 g; Refill: 1  + hyphae on microscopy. Will start above med. Discussed waxing and waning of seb derm. May consider ketoconazole shampoo or cream in future.     Female Pattern Alopecia  Start minoxidil 5% solution qd.  Discussed dx and various tx options.         Follow-up in about 4 weeks (around 10/10/2018) for seb derm.

## 2018-09-12 NOTE — PATIENT INSTRUCTIONS
Minoxidil 5% solution may be used to the scalp once daily for hair growth. Increased shedding of hair may be observed in th first 6 weeks. This medication may need to be continued indefinitely.     Cera Ve Anti-itch  Use a mild gentle soap such as Dove for Sensitive Skin or Cetaphil Gentle Cleanser.  Recommend fragrance free and hypoallergenic skin care products.  Shower or bathe once daily. Limit duration to 5 minutes with lukewarm water.  Apply moisturizer immediately after showering.  Some good moisturizers to try are Cetaphil, CeraVe, or Eucerin, Aveeno.

## 2018-09-14 ENCOUNTER — TELEPHONE (OUTPATIENT)
Dept: INTERNAL MEDICINE | Facility: CLINIC | Age: 68
End: 2018-09-14

## 2018-09-14 NOTE — TELEPHONE ENCOUNTER
Pt says, she injured her shoulder while working in garden. She was referred to ortho for that. At ortho appt, her bp was high. She was told it was probably because of the pain. She checked her bp this past Wednesday, it was 141/78. Today bp is 153/94. She has been taking the indapamide every day. She took it twice yesterday and bp was still elevated. She wants to know what does she need to do now?

## 2018-09-14 NOTE — TELEPHONE ENCOUNTER
----- Message from Chloé Angel sent at 9/14/2018 11:01 AM CDT -----  Contact: [phone]   Pt would like to consult with the nurse about High Blood Pressure and Pain form Shoulder.  Please call back at [phone].  x-

## 2018-09-14 NOTE — TELEPHONE ENCOUNTER
Called pt and booked with Leana for 9-17-18 at 10:00am. If she starts to feel bad or feels like she needs to be seen sooner, she may come to [facility] for evaluation.

## 2018-09-17 ENCOUNTER — OFFICE VISIT (OUTPATIENT)
Dept: INTERNAL MEDICINE | Facility: CLINIC | Age: 68
End: 2018-09-17
Payer: MEDICARE

## 2018-09-17 VITALS
TEMPERATURE: 99 F | WEIGHT: 217.13 LBS | HEART RATE: 80 BPM | HEIGHT: 61 IN | DIASTOLIC BLOOD PRESSURE: 80 MMHG | BODY MASS INDEX: 40.99 KG/M2 | RESPIRATION RATE: 16 BRPM | SYSTOLIC BLOOD PRESSURE: 126 MMHG

## 2018-09-17 DIAGNOSIS — I10 ESSENTIAL HYPERTENSION: Primary | ICD-10-CM

## 2018-09-17 DIAGNOSIS — M25.511 ACUTE PAIN OF RIGHT SHOULDER: ICD-10-CM

## 2018-09-17 PROCEDURE — 99214 OFFICE O/P EST MOD 30 MIN: CPT | Mod: S$PBB,,, | Performed by: NURSE PRACTITIONER

## 2018-09-17 PROCEDURE — 3074F SYST BP LT 130 MM HG: CPT | Mod: CPTII,,, | Performed by: NURSE PRACTITIONER

## 2018-09-17 PROCEDURE — 99215 OFFICE O/P EST HI 40 MIN: CPT | Mod: PBBFAC,PO | Performed by: NURSE PRACTITIONER

## 2018-09-17 PROCEDURE — 1101F PT FALLS ASSESS-DOCD LE1/YR: CPT | Mod: CPTII,,, | Performed by: NURSE PRACTITIONER

## 2018-09-17 PROCEDURE — 99999 PR PBB SHADOW E&M-EST. PATIENT-LVL V: CPT | Mod: PBBFAC,,, | Performed by: NURSE PRACTITIONER

## 2018-09-17 PROCEDURE — 3079F DIAST BP 80-89 MM HG: CPT | Mod: CPTII,,, | Performed by: NURSE PRACTITIONER

## 2018-09-17 RX ORDER — AMLODIPINE BESYLATE 2.5 MG/1
2.5 TABLET ORAL NIGHTLY
Qty: 30 TABLET | Refills: 11 | Status: SHIPPED | OUTPATIENT
Start: 2018-09-17 | End: 2019-07-22 | Stop reason: SDUPTHER

## 2018-09-17 RX ORDER — DIPHENHYDRAMINE HCL 25 MG
25 TABLET ORAL 2 TIMES DAILY PRN
COMMUNITY

## 2018-09-17 NOTE — PATIENT INSTRUCTIONS

## 2018-09-17 NOTE — PROGRESS NOTES
"Subjective:       Patient ID: Judi Brown is a 67 y.o. female.    Chief Complaint: Follow-up    Patient presents to the clinic with concern of elevated BP readings since last week. She has been compliant with her Lozol 2.5mg daily. She admits to drinking 1 cup of strong coffee daily but no diet changes. No sinus pills. No nsaids. She has ultracet to use prn for her shoulder pain. She had a shoulder joint injection last week in the orthopedic department. She is set to start physical therapy this week for her shoulder. She denies headache, chest pain, blurred vision, shortness of breath. She admits to taking 2 bp pills last Friday but has been taking just 1 since.    9/11 145/88  9/14 170-147/79-98  9/15 145/87  9/16 138/94        /80 (BP Location: Left arm, Patient Position: Sitting, BP Method: Medium (Automatic))   Pulse 80   Temp 99.2 °F (37.3 °C) (Tympanic)   Resp 16   Ht 5' 1" (1.549 m)   Wt 98.5 kg (217 lb 2.5 oz)   BMI 41.03 kg/m²     Review of Systems   Constitutional: Positive for activity change. Negative for appetite change, chills, diaphoresis, fatigue, fever and unexpected weight change.   HENT: Negative.    Eyes: Negative for visual disturbance.   Respiratory: Negative for cough, shortness of breath and wheezing.    Cardiovascular: Negative for chest pain, palpitations and leg swelling.   Gastrointestinal: Negative for abdominal distention, abdominal pain, blood in stool, constipation, diarrhea, nausea and vomiting.   Genitourinary: Negative for decreased urine volume, difficulty urinating, dysuria, frequency, hematuria and urgency.   Musculoskeletal: Positive for arthralgias.   Neurological: Negative.  Negative for dizziness, syncope, speech difficulty, light-headedness and headaches.   Psychiatric/Behavioral: Negative for agitation, confusion and hallucinations. The patient is not nervous/anxious.        Objective:      Physical Exam   Constitutional: She is oriented to person, place, and " time. She appears well-developed and well-nourished. She is cooperative. No distress.   HENT:   Head: Normocephalic and atraumatic.   Mouth/Throat: No oropharyngeal exudate.   Eyes: Conjunctivae are normal. Right eye exhibits no discharge. Left eye exhibits no discharge.   Cardiovascular: Normal rate, regular rhythm and normal heart sounds.   No murmur heard.  Pulmonary/Chest: Effort normal and breath sounds normal. No respiratory distress. She has no wheezes. She has no rales. She exhibits no tenderness.   Abdominal: Soft. She exhibits no distension.   Musculoskeletal: Normal range of motion. She exhibits no edema or tenderness.   Neurological: She is alert and oriented to person, place, and time.   Skin: Skin is warm and dry. No rash noted. She is not diaphoretic. No erythema.   Psychiatric: She has a normal mood and affect. Her behavior is normal. Judgment and thought content normal.   Nursing note and vitals reviewed.   discharge   Assessment:       1. Essential hypertension    2. Acute pain of right shoulder        Plan:       Judi was seen today for follow-up.    Diagnoses and all orders for this visit:    Essential hypertension  -     amLODIPine (NORVASC) 2.5 MG tablet; Take 1 tablet (2.5 mg total) by mouth nightly.    Acute pain of right shoulder    discussed with Dr. Castro who reviewed readings  Will start low dose amlodipine HS  Follow up in 3 weeks with BP diary  Discussed dash diet  Tylenol arthritis for shoulder pain, avoid nsaids

## 2018-09-18 ENCOUNTER — TELEPHONE (OUTPATIENT)
Dept: DERMATOLOGY | Facility: CLINIC | Age: 68
End: 2018-09-18

## 2018-09-19 ENCOUNTER — TELEPHONE (OUTPATIENT)
Dept: DERMATOLOGY | Facility: CLINIC | Age: 68
End: 2018-09-19

## 2018-09-19 NOTE — TELEPHONE ENCOUNTER
----- Message from Xi Jones sent at 9/19/2018  9:02 AM CDT -----  Contact: pt  Pt stated she's calling about her prescription that not covered by her insurance, she does have the name of two others the she can use 1. Ketoconazole 2. Clotrimazole, she can be reached at [phone] Thanks

## 2018-09-19 NOTE — TELEPHONE ENCOUNTER
Pt states she will wait for the PA approval and will purchase the OTC ketoconazole 2% cream.  She verbalized understanding of all information and recommendations given and had no further questions.

## 2018-09-19 NOTE — TELEPHONE ENCOUNTER
Returned call to pt and she stated she contacted her insurance company and they told her they would only cover the attached medications.  Explained to pt a PA was initiated for the Econazole 1% cream and we should receive a decision by Friday 9/21/18.  Pt states she just want the medication switched to the ketoconazole or the clobetasol.  Please advise.

## 2018-09-21 ENCOUNTER — TELEPHONE (OUTPATIENT)
Dept: DERMATOLOGY | Facility: CLINIC | Age: 68
End: 2018-09-21

## 2018-09-21 NOTE — TELEPHONE ENCOUNTER
PA for Econazole 1% cream denied.  Pt insurance plan will only cover: 1. Ketoconazole 2. Clotrimazole.  Please advise

## 2018-09-24 DIAGNOSIS — L21.9 SEBORRHEIC DERMATITIS: Primary | ICD-10-CM

## 2018-09-24 RX ORDER — KETOCONAZOLE 20 MG/G
CREAM TOPICAL 2 TIMES DAILY
Qty: 60 G | Refills: 5 | Status: SHIPPED | OUTPATIENT
Start: 2018-09-24

## 2018-09-25 NOTE — TELEPHONE ENCOUNTER
Pt informed of new script sent to pharmacy on file, verbalized understanding and had no further questions.

## 2018-10-01 ENCOUNTER — TELEPHONE (OUTPATIENT)
Dept: URGENT CARE | Facility: CLINIC | Age: 68
End: 2018-10-01

## 2018-10-01 NOTE — PROGRESS NOTES
Subjective:       Patient ID:  Judi Brown is a 68 y.o. female who presents for   Chief Complaint   Patient presents with    Hair Loss     c/o hair loss x 3 months    Dry Skin     c/o dry skin x several yrs     History of Present Illness: The patient presents with chief complaint of hair loss.  Location: frontal scalp  Duration: 3 months  Signs/Symptoms: +itching, +flaking, +hair shedding    Prior treatments: no treatments          Review of Systems   Constitutional: Negative for fever and chills.   Gastrointestinal: Negative for nausea and vomiting.   Skin: Negative for itching, rash, dry skin, daily sunscreen use, activity-related sunscreen use, recent sunburn and dry lips.   Hematologic/Lymphatic: Does not bruise/bleed easily.        Objective:    Physical Exam   Constitutional: She appears well-developed and well-nourished. No distress.   Neurological: She is alert and oriented to person, place, and time. She is not disoriented.   Psychiatric: She has a normal mood and affect.   Skin:   Areas Examined (abnormalities noted in diagram):   Scalp / Hair Palpated and Inspected  Head / Face Inspection Performed  Neck Inspection Performed  Chest / Axilla Inspection Performed  Back Inspection Performed  RUE Inspected  LUE Inspection Performed         Diagram Legend     Erythematous scaling macule/papule c/w actinic keratosis       Vascular papule c/w angioma      Pigmented verrucoid papule/plaque c/w seborrheic keratosis      Yellow umbilicated papule c/w sebaceous hyperplasia      Irregularly shaped tan macule c/w lentigo     1-2 mm smooth white papules consistent with Milia      Movable subcutaneous cyst with punctum c/w epidermal inclusion cyst      Subcutaneous movable cyst c/w pilar cyst      Firm pink to brown papule c/w dermatofibroma      Pedunculated fleshy papule(s) c/w skin tag(s)      Evenly pigmented macule c/w junctional nevus     Mildly variegated pigmented, slightly irregular-bordered macule c/w  mildly atypical nevus      Flesh colored to evenly pigmented papule c/w intradermal nevus       Pink pearly papule/plaque c/w basal cell carcinoma      Erythematous hyperkeratotic cursted plaque c/w SCC      Surgical scar with no sign of skin cancer recurrence      Open and closed comedones      Inflammatory papules and pustules      Verrucoid papule consistent consistent with wart     Erythematous eczematous patches and plaques     Dystrophic onycholytic nail with subungual debris c/w onychomycosis     Umbilicated papule    Erythematous-base heme-crusted tan verrucoid plaque consistent with inflamed seborrheic keratosis     Erythematous Silvery Scaling Plaque c/w Psoriasis     See annotation      Assessment / Plan:        Seborrheic dermatitis  -     econazole nitrate 1 % cream; Apply topically 2 (two) times daily. AAA of face qd x 2 weeks.  Dispense: 30 g; Refill: 1    Tinea corporis  -     econazole nitrate 1 % cream; Apply topically 2 (two) times daily. AAA of face qd x 2 weeks.  Dispense: 30 g; Refill: 1             Follow-up in about 4 weeks (around 10/10/2018) for seb derm.

## 2018-10-01 NOTE — TELEPHONE ENCOUNTER
----- Message from Elizabeth Bradley sent at 10/1/2018  1:59 PM CDT -----  Contact: [facility] ([phone]  Caller stated she faxed over Medicare Plan of Care and hadn't received it back. Please fax document to fax number ([phone])

## 2018-10-19 ENCOUNTER — TELEPHONE (OUTPATIENT)
Dept: OPHTHALMOLOGY | Facility: CLINIC | Age: 68
End: 2018-10-19

## 2018-10-19 NOTE — TELEPHONE ENCOUNTER
Patient is having pain and discomfort when working on computer, and wanted to move her appointment up, which I did.  I recommended her to increase her tears.

## 2018-10-23 ENCOUNTER — OFFICE VISIT (OUTPATIENT)
Dept: OPHTHALMOLOGY | Facility: CLINIC | Age: 68
End: 2018-10-23
Payer: MEDICARE

## 2018-10-23 DIAGNOSIS — H04.123 DRY EYES, BILATERAL: ICD-10-CM

## 2018-10-23 DIAGNOSIS — Z13.5 GLAUCOMA SCREENING: ICD-10-CM

## 2018-10-23 DIAGNOSIS — H57.13 PAIN OF BOTH EYES: Primary | ICD-10-CM

## 2018-10-23 PROCEDURE — 99212 OFFICE O/P EST SF 10 MIN: CPT | Mod: PBBFAC,PO | Performed by: OPTOMETRIST

## 2018-10-23 PROCEDURE — 99999 PR PBB SHADOW E&M-EST. PATIENT-LVL II: CPT | Mod: PBBFAC,,, | Performed by: OPTOMETRIST

## 2018-10-23 PROCEDURE — 92014 COMPRE OPH EXAM EST PT 1/>: CPT | Mod: S$PBB,,, | Performed by: OPTOMETRIST

## 2018-10-23 NOTE — PROGRESS NOTES
HPI     Hypertensive Eye Exam      Additional comments: Yearly              Comments     [place] [facility] [place] eye exam  *Chief complaint: sudden sharp pains in either eye while doing computer   work began a week ago.  When she called to schedule an appointment, she   was advised to increase her artificial tear use.  She had been using the   drops at bedtime only.  She has not had any more pains since she began   using her drops during the day.  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    No noticeable changes in vision since last eye exam  Wears +1.50 OCT readers    Patient using Murine eye drops  Recently had a rash on forehead that caused her eyebrows and eye lashes to   itch.  No other complaints    1. Dry Eyes  2. HTN  3. Fhx of glaucoma (brother)    Artificial tears qhs OU  Patanol prn OU          Last edited by Debora Pressley, OD on 10/23/2018  5:06 PM. (History)            Assessment /Plan     For exam results, see Encounter Report.    Pain of both eyes    Dry eyes, bilateral    Glaucoma screening      Eye pain caused by dryness.  Patient to increase the frequency of use of artificial tears.  Glaucoma screening and fundus exam normal.  No prescription glasses indicated; continue over the counter readers.  Return to clinic 1 yr.

## 2018-10-30 ENCOUNTER — TELEPHONE (OUTPATIENT)
Dept: INTERNAL MEDICINE | Facility: CLINIC | Age: 68
End: 2018-10-30

## 2018-10-30 NOTE — TELEPHONE ENCOUNTER
Called and no answer and no voicemail.  Need to schedule her US to check her kidney cyst.  Mailed letter to pt.    Pt called back , booked US for 11-12-18 at [facility] .

## 2018-11-08 ENCOUNTER — TELEPHONE (OUTPATIENT)
Dept: INTERNAL MEDICINE | Facility: CLINIC | Age: 68
End: 2018-11-08

## 2018-11-08 NOTE — TELEPHONE ENCOUNTER
----- Message from Shannen Becerar sent at 11/8/2018  9:29 AM CST -----  Contact: pt  The pt wants to know if she can take Gas-X before her 11/12 appt, the pt can be reached at [phone]///[id number]

## 2018-12-03 ENCOUNTER — TELEPHONE (OUTPATIENT)
Dept: RADIOLOGY | Facility: HOSPITAL | Age: 68
End: 2018-12-03

## 2018-12-04 ENCOUNTER — HOSPITAL ENCOUNTER (OUTPATIENT)
Dept: RADIOLOGY | Facility: HOSPITAL | Age: 68
Discharge: HOME | End: 2018-12-04
Attending: FAMILY MEDICINE
Payer: MEDICARE

## 2018-12-04 DIAGNOSIS — N28.1 CYST OF RIGHT KIDNEY: ICD-10-CM

## 2018-12-04 PROCEDURE — 76770 US EXAM ABDO BACK WALL COMP: CPT | Mod: TC,PO

## 2018-12-04 PROCEDURE — 76770 US EXAM ABDO BACK WALL COMP: CPT | Mod: 26,,, | Performed by: RADIOLOGY

## 2018-12-14 ENCOUNTER — OFFICE VISIT (OUTPATIENT)
Dept: INTERNAL MEDICINE | Facility: CLINIC | Age: 68
End: 2018-12-14
Payer: MEDICARE

## 2018-12-14 ENCOUNTER — LAB VISIT (OUTPATIENT)
Dept: LAB | Facility: HOSPITAL | Age: 68
End: 2018-12-14
Attending: FAMILY MEDICINE
Payer: MEDICARE

## 2018-12-14 VITALS
BODY MASS INDEX: 41.34 KG/M2 | DIASTOLIC BLOOD PRESSURE: 70 MMHG | SYSTOLIC BLOOD PRESSURE: 112 MMHG | HEART RATE: 68 BPM | HEIGHT: 61 IN | WEIGHT: 218.94 LBS | TEMPERATURE: 99 F

## 2018-12-14 DIAGNOSIS — N20.0 KIDNEY STONES: ICD-10-CM

## 2018-12-14 DIAGNOSIS — I10 ESSENTIAL HYPERTENSION: Primary | ICD-10-CM

## 2018-12-14 DIAGNOSIS — E66.01 MORBID OBESITY: ICD-10-CM

## 2018-12-14 DIAGNOSIS — N28.1 ACQUIRED RENAL CYST OF RIGHT KIDNEY: ICD-10-CM

## 2018-12-14 DIAGNOSIS — I10 ESSENTIAL HYPERTENSION: ICD-10-CM

## 2018-12-14 LAB
ALBUMIN SERPL BCP-MCNC: 3.6 G/DL
ALP SERPL-CCNC: 70 U/L
ALT SERPL W/O P-5'-P-CCNC: 33 U/L
ANION GAP SERPL CALC-SCNC: 8 MMOL/L
AST SERPL-CCNC: 27 U/L
BILIRUB SERPL-MCNC: 0.7 MG/DL
BILIRUB UR QL STRIP: NEGATIVE
BUN SERPL-MCNC: 15 MG/DL
CALCIUM SERPL-MCNC: 10 MG/DL
CHLORIDE SERPL-SCNC: 103 MMOL/L
CLARITY UR REFRACT.AUTO: ABNORMAL
CO2 SERPL-SCNC: 30 MMOL/L
COLOR UR AUTO: YELLOW
CREAT SERPL-MCNC: 0.9 MG/DL
EST. GFR  (AFRICAN AMERICAN): >60 ML/MIN/1.73 M^2
EST. GFR  (NON AFRICAN AMERICAN): >60 ML/MIN/1.73 M^2
GLUCOSE SERPL-MCNC: 79 MG/DL
GLUCOSE UR QL STRIP: NEGATIVE
HGB UR QL STRIP: NEGATIVE
KETONES UR QL STRIP: NEGATIVE
LEUKOCYTE ESTERASE UR QL STRIP: NEGATIVE
NITRITE UR QL STRIP: NEGATIVE
PH UR STRIP: 6 [PH] (ref 5–8)
POTASSIUM SERPL-SCNC: 4 MMOL/L
PROT SERPL-MCNC: 7.8 G/DL
PROT UR QL STRIP: NEGATIVE
SODIUM SERPL-SCNC: 141 MMOL/L
SP GR UR STRIP: 1.01 (ref 1–1.03)
URN SPEC COLLECT METH UR: ABNORMAL

## 2018-12-14 PROCEDURE — 3078F DIAST BP <80 MM HG: CPT | Mod: CPTII,S$GLB,, | Performed by: FAMILY MEDICINE

## 2018-12-14 PROCEDURE — G0008 ADMIN INFLUENZA VIRUS VAC: HCPCS | Mod: S$GLB,,, | Performed by: FAMILY MEDICINE

## 2018-12-14 PROCEDURE — 90662 IIV NO PRSV INCREASED AG IM: CPT | Mod: S$GLB,,, | Performed by: FAMILY MEDICINE

## 2018-12-14 PROCEDURE — 99214 OFFICE O/P EST MOD 30 MIN: CPT | Mod: 25,S$GLB,, | Performed by: FAMILY MEDICINE

## 2018-12-14 PROCEDURE — 3074F SYST BP LT 130 MM HG: CPT | Mod: CPTII,S$GLB,, | Performed by: FAMILY MEDICINE

## 2018-12-14 PROCEDURE — 80053 COMPREHEN METABOLIC PANEL: CPT

## 2018-12-14 PROCEDURE — 81003 URINALYSIS AUTO W/O SCOPE: CPT

## 2018-12-14 PROCEDURE — 36415 COLL VENOUS BLD VENIPUNCTURE: CPT | Mod: PO

## 2018-12-14 PROCEDURE — 99999 PR PBB SHADOW E&M-EST. PATIENT-LVL III: CPT | Mod: PBBFAC,,, | Performed by: FAMILY MEDICINE

## 2018-12-14 PROCEDURE — 1101F PT FALLS ASSESS-DOCD LE1/YR: CPT | Mod: CPTII,S$GLB,, | Performed by: FAMILY MEDICINE

## 2018-12-14 NOTE — PROGRESS NOTES
Subjective:      Patient ID: Judi Brown is a 68 y.o. female.    Chief Complaint: Medication Refill (flu shot)    HPI  69 yo female with HTN, R kidney cyst here for f/u on imaging.  She is doing well.  Has had 2 gout flare ups this year.  Uses the colchicine PRN.  Saw Ortho for shoulder.  Did some PT, now at home doing exercises and doing well with that.  Overall, feeling well.  Weight is up 6 lbs.  Off all soda, drinking just water.  No hematuria/flank pain.  No hx of kidney stones.    Past Medical History:   Diagnosis Date    Allergy     Arthritis     Cyst of right kidney     Hypertension      Family History   Problem Relation Age of Onset    Hypothyroidism Mother     Hypertension Sister     Hypertension Brother     Glaucoma Brother     Blindness Cousin     Diabetes Cousin     Cancer Cousin     Cataracts Cousin     Hypertension Son     Hypertension Other     Breast cancer Maternal Cousin     Breast cancer Maternal Cousin     Ovarian cancer Neg Hx     Deep vein thrombosis Neg Hx     Macular degeneration Neg Hx     Retinal detachment Neg Hx     Strabismus Neg Hx     Thyroid disease Neg Hx      Past Surgical History:   Procedure Laterality Date    BREAST BIOPSY      BUNIONECTOMY      bilateral    COLONOSCOPY N/A 11/16/2016    Procedure: COLONOSCOPY;  Surgeon: Cj Kruger III, MD;  Location: [facility];  Service: Endoscopy;  Laterality: N/A;    COLONOSCOPY N/A 11/16/2016    Performed by Cj Kruger III, MD at [facility] ENDO    ESOPHAGOGASTRODUODENOSCOPY (EGD) N/A 11/16/2016    Performed by Cj Kruger III, MD at [facility] ENDO    fatty tumor removal      lt shoulder    JOINT REPLACEMENT      TONSILLECTOMY      TOTAL KNEE ARTHROPLASTY      bilateral    TUBAL LIGATION       Social History     Tobacco Use    Smoking status: Never Smoker    Smokeless tobacco: Never Used   Substance Use Topics    Alcohol use: No    Drug use: No       /70 (BP Location: Right arm, Patient  "Position: Sitting, BP Method: X-Large (Manual))   Pulse 68   Temp 99 °F (37.2 °C) (Oral)   Ht 5' 1" (1.549 m)   Wt 99.3 kg (218 lb 14.7 oz)   BMI 41.36 kg/m²     Review of Systems   Constitutional: Negative for chills, fever and unexpected weight change.   Gastrointestinal: Negative for abdominal pain.   Genitourinary: Negative for difficulty urinating.       Objective:     Physical Exam   Constitutional: She is oriented to person, place, and time. She appears well-developed and well-nourished.   Cardiovascular: Normal rate, regular rhythm and normal heart sounds.   Pulmonary/Chest: Effort normal and breath sounds normal. No stridor. No respiratory distress.   Abdominal: Soft. Bowel sounds are normal. She exhibits no distension. There is no tenderness. There is no guarding.   Neg CVA tenderness   Neurological: She is alert and oriented to person, place, and time.   Skin: Skin is warm and dry.   Psychiatric: She has a normal mood and affect. Her behavior is normal. Judgment and thought content normal.   Nursing note and vitals reviewed.      Lab Results   Component Value Date    WBC 5.99 05/11/2018    HGB 12.9 05/11/2018    HCT 39.0 05/11/2018     05/11/2018    CHOL 159 05/18/2018    TRIG 88 05/18/2018    HDL 41 05/18/2018    ALT 32 05/18/2018    AST 32 05/18/2018     05/18/2018    K 4.5 05/18/2018     05/18/2018    CREATININE 1.1 05/18/2018    BUN 13 05/18/2018    CO2 29 05/18/2018    TSH 1.531 05/11/2018    GLUF 89 04/30/2008    HGBA1C 5.5 05/11/2018       Assessment:     1. Essential hypertension    2. Morbid obesity    3. Acquired renal cyst of right kidney    4. Kidney stones         Plan:     Essential hypertension  -     Comprehensive metabolic panel; Future; Expected date: 12/14/2018    Morbid obesity    Acquired renal cyst of right kidney  -     Comprehensive metabolic panel; Future; Expected date: 12/14/2018  -     URINALYSIS  -     CT Abdomen Pelvis  Without Contrast; Future; " Expected date: 12/14/2018    Kidney stones  -     Comprehensive metabolic panel; Future; Expected date: 12/14/2018  -     URINALYSIS  -     CT Abdomen Pelvis  Without Contrast; Future; Expected date: 12/14/2018    Other orders  -     Influenza - High Dose (65+) (PF) (IM)    BP stable  Update CMP/UA today  Reviewed US, stable cyst.  Some possible stones, will get CT scan for further evaluation.  Stay well hydrated with plenty of water  Flu shot today  Healthy eating/exercise/weight loss  F/u to be determined after review on above

## 2019-01-06 DIAGNOSIS — M10.9 ACUTE GOUT OF LEFT FOOT, UNSPECIFIED CAUSE: ICD-10-CM

## 2019-01-07 RX ORDER — COLCHICINE 0.6 MG/1
TABLET, FILM COATED ORAL
Qty: 30 TABLET | Refills: 2 | Status: SHIPPED | OUTPATIENT
Start: 2019-01-07

## 2019-01-10 RX ORDER — ERGOCALCIFEROL 1.25 MG/1
CAPSULE ORAL
Qty: 12 CAPSULE | Refills: 1 | Status: SHIPPED | OUTPATIENT
Start: 2019-01-10 | End: 2019-05-01 | Stop reason: SDUPTHER

## 2019-01-18 RX ORDER — POTASSIUM CHLORIDE 750 MG/1
TABLET, EXTENDED RELEASE ORAL
Qty: 30 TABLET | Refills: 6 | Status: SHIPPED | OUTPATIENT
Start: 2019-01-18 | End: 2019-07-22 | Stop reason: SDUPTHER

## 2019-02-13 ENCOUNTER — TELEPHONE (OUTPATIENT)
Dept: INTERNAL MEDICINE | Facility: CLINIC | Age: 69
End: 2019-02-13

## 2019-02-13 NOTE — TELEPHONE ENCOUNTER
Called pt to try and schedule her CT of abd for her that she canceled and she said that she did not have the money for the $150 co pay.  She said that [facility] put her in collections for a $10 bill she had and she doesn't want to do until she can pay for this.  She will call back to schedule.

## 2019-02-18 RX ORDER — INDAPAMIDE 2.5 MG/1
TABLET ORAL
Qty: 30 TABLET | Refills: 6 | Status: SHIPPED | OUTPATIENT
Start: 2019-02-18 | End: 2019-07-22 | Stop reason: SDUPTHER

## 2019-05-01 RX ORDER — ALBUTEROL SULFATE 90 UG/1
2 AEROSOL, METERED RESPIRATORY (INHALATION) EVERY 4 HOURS PRN
Qty: 3 INHALER | Refills: 0 | Status: SHIPPED | OUTPATIENT
Start: 2019-05-01

## 2019-05-01 RX ORDER — ERGOCALCIFEROL 1.25 MG/1
50000 CAPSULE ORAL
Qty: 12 CAPSULE | Refills: 1 | Status: SHIPPED | OUTPATIENT
Start: 2019-05-01

## 2019-05-01 NOTE — TELEPHONE ENCOUNTER
----- Message from Shannen Hernan sent at 4/30/2019  4:48 PM CDT -----  Contact: pt  1. What is the name of the medication you are requesting? Vitamin D / Inhaler  2. What is the dose? n/a  3. How do you take the medication? Orally, topically, etc? n/a  4. How often do you take this medication? n/a  5. Do you need a 30 day or 90 day supply? na  6. How many refills are you requesting? n/a  7. What is your preferred pharmacy and location of the pharmacy? [facility] in [place]  8. Who can we contact with further questions? The pt at [phone]

## 2019-05-08 ENCOUNTER — OFFICE VISIT (OUTPATIENT)
Dept: URGENT CARE | Facility: CLINIC | Age: 69
End: 2019-05-08
Payer: MEDICARE

## 2019-05-08 VITALS
TEMPERATURE: 100 F | BODY MASS INDEX: 40.4 KG/M2 | DIASTOLIC BLOOD PRESSURE: 78 MMHG | HEART RATE: 91 BPM | WEIGHT: 219.56 LBS | RESPIRATION RATE: 16 BRPM | OXYGEN SATURATION: 97 % | SYSTOLIC BLOOD PRESSURE: 132 MMHG | HEIGHT: 62 IN

## 2019-05-08 DIAGNOSIS — R06.02 SOB (SHORTNESS OF BREATH): ICD-10-CM

## 2019-05-08 DIAGNOSIS — J45.20 MILD INTERMITTENT REACTIVE AIRWAY DISEASE WITH WHEEZING WITHOUT COMPLICATION: ICD-10-CM

## 2019-05-08 DIAGNOSIS — J30.89 ALLERGIC RHINITIS DUE TO OTHER ALLERGIC TRIGGER, UNSPECIFIED SEASONALITY: ICD-10-CM

## 2019-05-08 DIAGNOSIS — R06.2 WHEEZING: Primary | ICD-10-CM

## 2019-05-08 PROCEDURE — 99214 PR OFFICE/OUTPT VISIT, EST, LEVL IV, 30-39 MIN: ICD-10-PCS | Mod: 25,S$GLB,, | Performed by: NURSE PRACTITIONER

## 2019-05-08 PROCEDURE — 99499 UNLISTED E&M SERVICE: CPT | Mod: S$GLB,,, | Performed by: NURSE PRACTITIONER

## 2019-05-08 PROCEDURE — 1101F PR PT FALLS ASSESS DOC 0-1 FALLS W/OUT INJ PAST YR: ICD-10-PCS | Mod: CPTII,S$GLB,, | Performed by: NURSE PRACTITIONER

## 2019-05-08 PROCEDURE — 3078F PR MOST RECENT DIASTOLIC BLOOD PRESSURE < 80 MM HG: ICD-10-PCS | Mod: CPTII,S$GLB,, | Performed by: NURSE PRACTITIONER

## 2019-05-08 PROCEDURE — 1101F PT FALLS ASSESS-DOCD LE1/YR: CPT | Mod: CPTII,S$GLB,, | Performed by: NURSE PRACTITIONER

## 2019-05-08 PROCEDURE — 3075F SYST BP GE 130 - 139MM HG: CPT | Mod: CPTII,S$GLB,, | Performed by: NURSE PRACTITIONER

## 2019-05-08 PROCEDURE — 3075F PR MOST RECENT SYSTOLIC BLOOD PRESS GE 130-139MM HG: ICD-10-PCS | Mod: CPTII,S$GLB,, | Performed by: NURSE PRACTITIONER

## 2019-05-08 PROCEDURE — 99999 PR PBB SHADOW E&M-EST. PATIENT-LVL V: ICD-10-PCS | Mod: PBBFAC,,, | Performed by: NURSE PRACTITIONER

## 2019-05-08 PROCEDURE — 3078F DIAST BP <80 MM HG: CPT | Mod: CPTII,S$GLB,, | Performed by: NURSE PRACTITIONER

## 2019-05-08 PROCEDURE — 99499 RISK ADDL DX/OHS AUDIT: ICD-10-PCS | Mod: S$GLB,,, | Performed by: NURSE PRACTITIONER

## 2019-05-08 PROCEDURE — 94640 AIRWAY INHALATION TREATMENT: CPT | Mod: S$GLB,,, | Performed by: NURSE PRACTITIONER

## 2019-05-08 PROCEDURE — 94640 PR INHAL RX, AIRWAY OBST/DX SPUTUM INDUCT: ICD-10-PCS | Mod: S$GLB,,, | Performed by: NURSE PRACTITIONER

## 2019-05-08 PROCEDURE — 99999 PR PBB SHADOW E&M-EST. PATIENT-LVL V: CPT | Mod: PBBFAC,,, | Performed by: NURSE PRACTITIONER

## 2019-05-08 PROCEDURE — 99214 OFFICE O/P EST MOD 30 MIN: CPT | Mod: 25,S$GLB,, | Performed by: NURSE PRACTITIONER

## 2019-05-08 RX ORDER — IPRATROPIUM BROMIDE AND ALBUTEROL SULFATE 2.5; .5 MG/3ML; MG/3ML
3 SOLUTION RESPIRATORY (INHALATION) EVERY 6 HOURS PRN
Qty: 1 BOX | Refills: 0 | Status: SHIPPED | OUTPATIENT
Start: 2019-05-08 | End: 2019-05-08 | Stop reason: CLARIF

## 2019-05-08 RX ORDER — FLUTICASONE PROPIONATE 50 MCG
2 SPRAY, SUSPENSION (ML) NASAL DAILY PRN
Qty: 1 BOTTLE | Refills: 3 | Status: SHIPPED | OUTPATIENT
Start: 2019-05-08

## 2019-05-08 RX ORDER — METHYLPREDNISOLONE 4 MG/1
TABLET ORAL
Qty: 1 PACKAGE | Refills: 0 | Status: SHIPPED | OUTPATIENT
Start: 2019-05-08 | End: 2019-07-22 | Stop reason: ALTCHOICE

## 2019-05-08 RX ORDER — IPRATROPIUM BROMIDE AND ALBUTEROL SULFATE 2.5; .5 MG/3ML; MG/3ML
3 SOLUTION RESPIRATORY (INHALATION)
Status: COMPLETED | OUTPATIENT
Start: 2019-05-08 | End: 2019-05-08

## 2019-05-08 RX ADMIN — IPRATROPIUM BROMIDE AND ALBUTEROL SULFATE 3 ML: 2.5; .5 SOLUTION RESPIRATORY (INHALATION) at 03:05

## 2019-05-08 NOTE — PROGRESS NOTES
"Subjective:       Patient ID: Judi Brown is a 68 y.o. female.    Chief Complaint: Shortness of Breath (anxiety)    HPI  Judi Brown presents to urgent care today with complaints of SOB. She states she visited her brother last weekend stayed at his house for two days. He is a heavy smoker. She is allergic to smoke. Monday she started feeling SOB. She feels like she is not getting enough air with a deep breath. Denies cough, fever, chills, sweats. Has been using her inhaler with minimal relief. States she is snxious because she feels she can't breathe. Also a lot of stress right now, her  is in the ICU after a care wreck also over the weekned.   /78 (BP Location: Left arm, Patient Position: Sitting, BP Method: Medium (Manual))   Pulse (!) 111   Temp 99.5 °F (37.5 °C) (Oral)   Ht 5' 2" (1.575 m)   Wt 99.6 kg (219 lb 9.3 oz)   SpO2 96%   BMI 40.16 kg/m²     Review of Systems   Constitutional: Negative for chills, diaphoresis and fever.   HENT: Positive for congestion. Negative for postnasal drip, sinus pressure and sore throat.    Respiratory: Positive for chest tightness and shortness of breath. Negative for cough.    Cardiovascular: Negative for chest pain and palpitations.   Gastrointestinal: Negative for abdominal distention, abdominal pain, diarrhea, nausea and vomiting.   Genitourinary: Negative for difficulty urinating.   Musculoskeletal: Negative for myalgias.   Skin: Negative for rash and wound.   Allergic/Immunologic: Negative for immunocompromised state.   Neurological: Negative for headaches.   Hematological: Does not bruise/bleed easily.   Psychiatric/Behavioral: Negative for behavioral problems and confusion.       Objective:      Physical Exam   Constitutional: She is oriented to person, place, and time. She appears well-developed and well-nourished. No distress.   HENT:   Head: Normocephalic and atraumatic.   Right Ear: Tympanic membrane and ear canal normal.   Left Ear: Tympanic " membrane and ear canal normal.   Nose: Nose normal. No mucosal edema. Right sinus exhibits no maxillary sinus tenderness and no frontal sinus tenderness. Left sinus exhibits no maxillary sinus tenderness and no frontal sinus tenderness.   Mouth/Throat: Uvula is midline. No oropharyngeal exudate, posterior oropharyngeal edema or posterior oropharyngeal erythema.   Eyes: Pupils are equal, round, and reactive to light. Conjunctivae and EOM are normal.   Neck: Neck supple.   Cardiovascular: Normal rate, regular rhythm and intact distal pulses.   No murmur heard.  Pulmonary/Chest: No respiratory distress. She has wheezes (Scattered expiratory).   Abdominal: Soft. Bowel sounds are normal. There is no tenderness.   Musculoskeletal: Normal range of motion. She exhibits no edema or deformity.   Lymphadenopathy:     She has no cervical adenopathy.   Neurological: She is alert and oriented to person, place, and time.   Skin: Skin is warm and dry. No rash noted. She is not diaphoretic.   Psychiatric: She has a normal mood and affect. Her behavior is normal.   Vitals reviewed.      Assessment:       1. Wheezing    2. SOB (shortness of breath)    3. Allergic rhinitis due to other allergic trigger, unspecified seasonality    4. Mild intermittent reactive airway disease with wheezing without complication        Plan:       Judi was seen today for shortness of breath.    Diagnoses and all orders for this visit:    Wheezing  -     Discontinue: albuterol-ipratropium (DUO-NEB) 2.5 mg-0.5 mg/3 mL nebulizer solution; Take 3 mLs by nebulization every 6 (six) hours as needed for Wheezing. Rescue  -     albuterol-ipratropium 2.5 mg-0.5 mg/3 mL nebulizer solution 3 mL    SOB (shortness of breath)  -     Discontinue: albuterol-ipratropium (DUO-NEB) 2.5 mg-0.5 mg/3 mL nebulizer solution; Take 3 mLs by nebulization every 6 (six) hours as needed for Wheezing. Rescue  -     albuterol-ipratropium 2.5 mg-0.5 mg/3 mL nebulizer solution 3  mL    Allergic rhinitis due to other allergic trigger, unspecified seasonality  -     fluticasone propionate (FLONASE) 50 mcg/actuation nasal spray; 2 sprays (100 mcg total) by Each Nare route daily as needed for Rhinitis. 2 Aerosol, Spray Nasal Every day    Mild intermittent reactive airway disease with wheezing without complication  -     methylPREDNISolone (MEDROL DOSEPACK) 4 mg tablet; use as directed    Much improved after nebulizer therapy. Patient feels she can take a deep breath and wheezing resolved.   If symptoms worsen or fail to improve, follow-up with primary care doctor or nearest ER. After visit summary given and discussed. Patient verbalized understanding and agrees with treatment plan. Patient remained stable and was discharged in no acute distress.   There are no Patient Instructions on file for this visit.

## 2019-05-17 ENCOUNTER — TELEPHONE (OUTPATIENT)
Dept: INTERNAL MEDICINE | Facility: CLINIC | Age: 69
End: 2019-05-17

## 2019-05-17 NOTE — TELEPHONE ENCOUNTER
----- Message from Alexa Zacarias sent at 5/17/2019 11:20 AM CDT -----  Contact: self  states that she's having dry throat w/ cough due to nebulizer, is this normal..[phone]

## 2019-05-17 NOTE — TELEPHONE ENCOUNTER
Spoke with pt, explained to pt that those are side effects of the albuterol. She verbalized understanding and stated, she will cancel her appt for Monday. She was only coming in because of those symptoms, but if those are side effects to the albuterol she doesn't need to come to her appt. That was the only reason she was coming. Explained to pt that if she is having any other problems then I recommend she still come in. Pt said, she doesn't have any problems. appt cancelled.

## 2019-05-23 ENCOUNTER — OFFICE VISIT (OUTPATIENT)
Dept: URGENT CARE | Facility: CLINIC | Age: 69
End: 2019-05-23
Payer: MEDICARE

## 2019-05-23 ENCOUNTER — HOSPITAL ENCOUNTER (OUTPATIENT)
Dept: RADIOLOGY | Facility: HOSPITAL | Age: 69
Discharge: HOME | End: 2019-05-23
Attending: NURSE PRACTITIONER
Payer: MEDICARE

## 2019-05-23 VITALS
OXYGEN SATURATION: 97 % | WEIGHT: 214.31 LBS | DIASTOLIC BLOOD PRESSURE: 80 MMHG | BODY MASS INDEX: 39.44 KG/M2 | HEART RATE: 98 BPM | SYSTOLIC BLOOD PRESSURE: 130 MMHG | TEMPERATURE: 97 F | HEIGHT: 62 IN

## 2019-05-23 DIAGNOSIS — R09.82 PND (POST-NASAL DRIP): ICD-10-CM

## 2019-05-23 DIAGNOSIS — R05.9 COUGH: ICD-10-CM

## 2019-05-23 DIAGNOSIS — J32.9 SINUSITIS, UNSPECIFIED CHRONICITY, UNSPECIFIED LOCATION: Primary | ICD-10-CM

## 2019-05-23 DIAGNOSIS — J40 BRONCHITIS: ICD-10-CM

## 2019-05-23 PROCEDURE — 71046 XR CHEST PA AND LATERAL: ICD-10-PCS | Mod: 26,,, | Performed by: RADIOLOGY

## 2019-05-23 PROCEDURE — 71046 X-RAY EXAM CHEST 2 VIEWS: CPT | Mod: 26,,, | Performed by: RADIOLOGY

## 2019-05-23 PROCEDURE — 3079F DIAST BP 80-89 MM HG: CPT | Mod: CPTII,S$GLB,, | Performed by: NURSE PRACTITIONER

## 2019-05-23 PROCEDURE — 1101F PT FALLS ASSESS-DOCD LE1/YR: CPT | Mod: CPTII,S$GLB,, | Performed by: NURSE PRACTITIONER

## 2019-05-23 PROCEDURE — 3075F SYST BP GE 130 - 139MM HG: CPT | Mod: CPTII,S$GLB,, | Performed by: NURSE PRACTITIONER

## 2019-05-23 PROCEDURE — 1101F PR PT FALLS ASSESS DOC 0-1 FALLS W/OUT INJ PAST YR: ICD-10-PCS | Mod: CPTII,S$GLB,, | Performed by: NURSE PRACTITIONER

## 2019-05-23 PROCEDURE — 3075F PR MOST RECENT SYSTOLIC BLOOD PRESS GE 130-139MM HG: ICD-10-PCS | Mod: CPTII,S$GLB,, | Performed by: NURSE PRACTITIONER

## 2019-05-23 PROCEDURE — 3079F PR MOST RECENT DIASTOLIC BLOOD PRESSURE 80-89 MM HG: ICD-10-PCS | Mod: CPTII,S$GLB,, | Performed by: NURSE PRACTITIONER

## 2019-05-23 PROCEDURE — 99214 PR OFFICE/OUTPT VISIT, EST, LEVL IV, 30-39 MIN: ICD-10-PCS | Mod: S$GLB,,, | Performed by: NURSE PRACTITIONER

## 2019-05-23 PROCEDURE — 71046 X-RAY EXAM CHEST 2 VIEWS: CPT | Mod: TC,FY,PO

## 2019-05-23 PROCEDURE — 99999 PR PBB SHADOW E&M-EST. PATIENT-LVL V: ICD-10-PCS | Mod: PBBFAC,,, | Performed by: NURSE PRACTITIONER

## 2019-05-23 PROCEDURE — 99214 OFFICE O/P EST MOD 30 MIN: CPT | Mod: S$GLB,,, | Performed by: NURSE PRACTITIONER

## 2019-05-23 PROCEDURE — 99999 PR PBB SHADOW E&M-EST. PATIENT-LVL V: CPT | Mod: PBBFAC,,, | Performed by: NURSE PRACTITIONER

## 2019-05-23 RX ORDER — IPRATROPIUM BROMIDE AND ALBUTEROL SULFATE 2.5; .5 MG/3ML; MG/3ML
3 SOLUTION RESPIRATORY (INHALATION) EVERY 6 HOURS PRN
Qty: 1 BOX | Refills: 0 | Status: SHIPPED | OUTPATIENT
Start: 2019-05-23 | End: 2020-05-22

## 2019-05-23 RX ORDER — PROMETHAZINE HYDROCHLORIDE AND DEXTROMETHORPHAN HYDROBROMIDE 6.25; 15 MG/5ML; MG/5ML
5 SYRUP ORAL NIGHTLY PRN
Qty: 118 ML | Refills: 0 | Status: SHIPPED | OUTPATIENT
Start: 2019-05-23 | End: 2019-06-02

## 2019-05-23 RX ORDER — AZITHROMYCIN 250 MG/1
250 TABLET, FILM COATED ORAL DAILY
Qty: 6 TABLET | Refills: 0 | Status: SHIPPED | OUTPATIENT
Start: 2019-05-23 | End: 2019-05-28

## 2019-05-23 NOTE — PROGRESS NOTES
Subjective:       Patient ID: Judi Brown is a 68 y.o. female.    Chief Complaint: Sinus Problem    Pt is a 68 year old female to clinic today with continued complaints of cough, HA, ear fullness, rattling in chest, sinus pressure and hoarseness that began 2-3 weeks ago.     Sinus Problem   This is a recurrent problem. The current episode started 1 to 4 weeks ago. The problem has been gradually worsening since onset. There has been no fever. Her pain is at a severity of 3/10. The pain is mild. Associated symptoms include congestion, coughing, headaches, shortness of breath, sinus pressure and a sore throat. Pertinent negatives include no chills, diaphoresis, ear pain, hoarse voice, neck pain, sneezing or swollen glands. Treatments tried: albuterol, medrol dose pack, benadryl.  The treatment provided mild relief.     Review of Systems   Constitutional: Negative for chills, diaphoresis, fatigue and fever.   HENT: Positive for congestion, postnasal drip, rhinorrhea, sinus pressure, sinus pain and sore throat. Negative for ear discharge, ear pain, hoarse voice, sneezing and trouble swallowing.    Respiratory: Positive for cough, chest tightness and shortness of breath. Negative for wheezing.    Cardiovascular: Negative for chest pain and palpitations.   Gastrointestinal: Negative for abdominal pain, diarrhea, nausea and vomiting.   Musculoskeletal: Negative for back pain, myalgias and neck pain.   Skin: Negative for rash.   Neurological: Positive for headaches. Negative for dizziness and light-headedness.       Objective:      Physical Exam   Constitutional: She is oriented to person, place, and time. She appears well-developed and well-nourished. No distress.   HENT:   Head: Normocephalic.   Right Ear: External ear and ear canal normal. No tenderness. Tympanic membrane is not bulging. A middle ear effusion is present.   Left Ear: External ear and ear canal normal. No tenderness. Tympanic membrane is not bulging. A  middle ear effusion is present.   Nose: Mucosal edema and rhinorrhea present. Right sinus exhibits maxillary sinus tenderness and frontal sinus tenderness. Left sinus exhibits maxillary sinus tenderness and frontal sinus tenderness.   Mouth/Throat: Uvula is midline, oropharynx is clear and moist and mucous membranes are normal. No oropharyngeal exudate, posterior oropharyngeal edema or posterior oropharyngeal erythema.   PND noted   Eyes: Pupils are equal, round, and reactive to light. Conjunctivae and EOM are normal.   Neck: Normal range of motion. Neck supple.   Cardiovascular: Normal rate, regular rhythm and normal heart sounds. Exam reveals no gallop and no friction rub.   No murmur heard.  Pulmonary/Chest: Effort normal. No accessory muscle usage or stridor. No apnea, no tachypnea and no bradypnea. No respiratory distress. She has no decreased breath sounds. She has no wheezes. She has rhonchi in the right upper field, the right lower field, the left upper field and the left lower field. She has no rales.   Lymphadenopathy:        Head (right side): No submental, no submandibular and no tonsillar adenopathy present.        Head (left side): No submental, no submandibular and no tonsillar adenopathy present.     She has no cervical adenopathy.   Neurological: She is alert and oriented to person, place, and time.   Skin: Skin is warm and dry. No rash noted. She is not diaphoretic.   Psychiatric: She has a normal mood and affect. Her speech is normal and behavior is normal. Thought content normal.   Nursing note and vitals reviewed.      Assessment:       1. Sinusitis, unspecified chronicity, unspecified location    2. Bronchitis    3. Cough    4. PND (post-nasal drip)        Plan:   Sinusitis, unspecified chronicity, unspecified location  -     azithromycin (Z-JEREMIAH) 250 MG tablet; Take 1 tablet (250 mg total) by mouth once daily. Take two tablets on day one and take one tablet daily for the next four days. for 5  days  Dispense: 6 tablet; Refill: 0    Bronchitis  -     promethazine-dextromethorphan (PROMETHAZINE-DM) 6.25-15 mg/5 mL Syrp; Take 5 mLs by mouth nightly as needed.  Dispense: 118 mL; Refill: 0  -     albuterol-ipratropium (DUO-NEB) 2.5 mg-0.5 mg/3 mL nebulizer solution; Take 3 mLs by nebulization every 6 (six) hours as needed for Wheezing. Rescue  Dispense: 1 Box; Refill: 0  -     X-Ray Chest PA And Lateral; Future; Expected date: 05/23/2019    Cough  -     promethazine-dextromethorphan (PROMETHAZINE-DM) 6.25-15 mg/5 mL Syrp; Take 5 mLs by mouth nightly as needed.  Dispense: 118 mL; Refill: 0  -     albuterol-ipratropium (DUO-NEB) 2.5 mg-0.5 mg/3 mL nebulizer solution; Take 3 mLs by nebulization every 6 (six) hours as needed for Wheezing. Rescue  Dispense: 1 Box; Refill: 0  -     X-Ray Chest PA And Lateral; Future; Expected date: 05/23/2019    PND (post-nasal drip)      Pt states has taken zithromax in past with no reactions.  Will notify of abnormal xray.     · Rest and increase fluids.   · May apply warm compresses as needed.   · Saline nasal spray or saline irrigation (Neti pot) to loosen nasal congestion.  · Flonase or Nasacort to reduce inflammation in the sinus cavities.  · Take antibiotics exactly as prescribed. Make sure to complete the entire course of antibiotics even if you start feeling better. This will prevent recurrence of your infection and bacterial resistance.   · Do not drive, drink alcohol, or take any other sedating medications or substances while taking cough syrup.   · Follow up with your primary care provider or with ENT if not improved within a few days or sooner for any new or worsening symptoms.   · Go to the ER for any fever that does not improve with Tylenol/Ibuprofen, neck stiffness, rash, severe headache, vision changes, shortness of breath, chest pain, severe facial pain or swelling, or for any other new and concerning symptoms.

## 2019-05-23 NOTE — PATIENT INSTRUCTIONS
What Is Acute Bronchitis?  Acute bronchitis is when the airways in your lungs (bronchial tubes) become red and swollen (inflamed). It is usually caused by a viral infection. But it can also occur because of a bacteria or allergen. Symptoms include a cough that produces yellow or greenish mucus and can last for days or sometimes weeks.  Inside healthy lungs    Air travels in and out of the lungs through the airways. The linings of these airways produce sticky mucus. This mucus traps particles that enter the lungs. Tiny structures called cilia then sweep the particles out of the airways.     Healthy airway: Airways are normally open. Air moves in and out easily.      Healthy cilia: Tiny, hairlike cilia sweep mucus and particles up and out of the airways.   Lungs with bronchitis  Bronchitis often occurs with a cold or the flu virus. The airways become inflamed (red and swollen). There is a deep hacking cough from the extra mucus. Other symptoms may include:  · Wheezing  · Chest discomfort  · Shortness of breath  · Mild fever  A second infection, this time due to bacteria, may then occur. And airways irritated by allergens or smoke are more likely to get infected.        Inflamed airway: Inflammation and extra mucus narrow the airway, causing shortness of breath.      Impaired cilia: Extra mucus impairs cilia, causing congestion and wheezing. Smoking makes the problem worse.   Making a diagnosis  A physical exam, health history, and certain tests help your healthcare provider make the diagnosis.  Health history  Your healthcare provider will ask you about your symptoms.  The exam  Your provider listens to your chest for signs of congestion. He or she may also check your ears, nose, and throat.  Possible tests  · A sputum test for bacteria. This requires a sample of mucus from your lungs.  · A nasal or throat swab. This tests to see if you have a bacterial infection.  · A chest X-ray. This is done if your healthcare  provider thinks you have pneumonia.  · Tests to check for an underlying condition. Other tests may be done to check for things such as allergies, asthma, or COPD (chronic obstructive pulmonary disease). You may need to see a specialist for more lung function testing.  Treating a cough  The main treatment for bronchitis is easing symptoms. Avoiding smoke, allergens, and other things that trigger coughing can often help. If the infection is bacterial, you may be given antibiotics. During the illness, it's important to get plenty of sleep. To ease symptoms:  · Dont smoke. Also avoid secondhand smoke.  · Use a humidifier. Or try breathing in steam from a hot shower. This may help loosen mucus.  · Drink a lot of water and juice. They can soothe the throat and may help thin mucus.  · Sit up or use extra pillows when in bed. This helps to lessen coughing and congestion.  · Ask your provider about using medicine. Ask about using cough medicine, pain and fever medicine, or a decongestant.  Antibiotics  Most cases of bronchitis are caused by cold or flu viruses. They dont need antibiotics to treat them, even if your mucus is thick and green or yellow. Antibiotics dont treat viral illness and antibiotics have not been shown to have any benefit in cases of acute bronchitis. Taking antibiotics when they are not needed increases your risk of getting an infection later that is antibiotic-resistant. Antibiotics can also cause severe cases of diarrhea that require other antibiotics to treat.  It is important that you accept your healthcare provider's opinion to not use antibiotics. Your provider will prescribe antibiotics if the infection is caused by bacteria. If they are prescribed:  · Take all of the medicine. Take the medicine until it is used up, even if symptoms have improved. If you dont, the bronchitis may come back.  · Take the medicines as directed. For instance, some medicines should be taken with food.  · Ask about  side effects. Ask your provider or pharmacist what side effects are common, and what to do about them.  Follow-up care  You should see your provider again in 2 to 3 weeks. By this time, symptoms should have improved. An infection that lasts longer may mean you have a more serious problem.  Prevention  · Avoid tobacco smoke. If you smoke, quit. Stay away from smoky places. Ask friends and family not to smoke around you, or in your home or car.  · Get checked for allergies.  · Ask your provider about getting a yearly flu shot. Also ask about pneumococcal or pneumonia shots.  · Wash your hands often. This helps reduce the chance of picking up viruses that cause colds and flu.  Call your healthcare provider if:  · Symptoms worsen, or you have new symptoms  · Breathing problems worsen or  become severe  · Symptoms dont get better within a week, or within 3 days of taking antibiotics   Date Last Reviewed: 2/1/2017  © 3195-0783 [employer]. [address] [place] [address], [place] [address]. All rights reserved. This information is not intended as a substitute for professional medical care. Always follow your healthcare professional's instructions.

## 2019-07-18 ENCOUNTER — TELEPHONE (OUTPATIENT)
Dept: INTERNAL MEDICINE | Facility: CLINIC | Age: 69
End: 2019-07-18

## 2019-07-18 NOTE — TELEPHONE ENCOUNTER
----- Message from Wili Castro sent at 7/18/2019 11:56 AM CDT -----  Contact: PT  Type:  Needs Medical Advice    Who Called: Pt   Symptoms (please be specific): n/a   How long has patient had these symptoms:  n/a  Pharmacy name and phone #:  n/a  Would the patient rather a call back or a response via [facility]? Call back   Best Call Back Number:  [phone] (home)   Additional Information: The patient is calling in regards to informing the staff of her pre-op clearance with teresita kim and has further questions concerning this upcoming appointment,please advise.

## 2019-07-18 NOTE — TELEPHONE ENCOUNTER
Spoke with pt, she said, she just wanted to let us know she's scheduled for surgery and couldn't get an appt with Dr. Castro before the surgery, so she scheduled with Leana. Informed her that will be fine. She said, she has a lot of papers to be filled out and tests that need to be done. Instructed pt to bring her form to the appt. She verbalized understanding.

## 2019-07-22 ENCOUNTER — HOSPITAL ENCOUNTER (OUTPATIENT)
Dept: RADIOLOGY | Facility: HOSPITAL | Age: 69
Discharge: HOME | End: 2019-07-22
Attending: NURSE PRACTITIONER
Payer: MEDICARE

## 2019-07-22 ENCOUNTER — OFFICE VISIT (OUTPATIENT)
Dept: INTERNAL MEDICINE | Facility: CLINIC | Age: 69
End: 2019-07-22
Payer: MEDICARE

## 2019-07-22 VITALS
WEIGHT: 214.94 LBS | HEART RATE: 74 BPM | BODY MASS INDEX: 40.58 KG/M2 | RESPIRATION RATE: 16 BRPM | SYSTOLIC BLOOD PRESSURE: 136 MMHG | TEMPERATURE: 98 F | DIASTOLIC BLOOD PRESSURE: 74 MMHG | HEIGHT: 61 IN

## 2019-07-22 DIAGNOSIS — I10 ESSENTIAL HYPERTENSION: ICD-10-CM

## 2019-07-22 DIAGNOSIS — M48.02 CERVICAL STENOSIS OF SPINE: ICD-10-CM

## 2019-07-22 DIAGNOSIS — Z01.818 PRE-OP EXAMINATION: Primary | ICD-10-CM

## 2019-07-22 DIAGNOSIS — Z01.818 PRE-OP EXAMINATION: ICD-10-CM

## 2019-07-22 PROCEDURE — 71045 XR CHEST 1 VIEW: ICD-10-PCS | Mod: 26,,, | Performed by: RADIOLOGY

## 2019-07-22 PROCEDURE — 1101F PR PT FALLS ASSESS DOC 0-1 FALLS W/OUT INJ PAST YR: ICD-10-PCS | Mod: CPTII,S$GLB,, | Performed by: NURSE PRACTITIONER

## 2019-07-22 PROCEDURE — 99214 OFFICE O/P EST MOD 30 MIN: CPT | Mod: S$GLB,,, | Performed by: NURSE PRACTITIONER

## 2019-07-22 PROCEDURE — 99214 PR OFFICE/OUTPT VISIT, EST, LEVL IV, 30-39 MIN: ICD-10-PCS | Mod: S$GLB,,, | Performed by: NURSE PRACTITIONER

## 2019-07-22 PROCEDURE — 71045 X-RAY EXAM CHEST 1 VIEW: CPT | Mod: TC,FY,PO

## 2019-07-22 PROCEDURE — 3078F DIAST BP <80 MM HG: CPT | Mod: CPTII,S$GLB,, | Performed by: NURSE PRACTITIONER

## 2019-07-22 PROCEDURE — 81003 URINALYSIS AUTO W/O SCOPE: CPT

## 2019-07-22 PROCEDURE — 3075F SYST BP GE 130 - 139MM HG: CPT | Mod: CPTII,S$GLB,, | Performed by: NURSE PRACTITIONER

## 2019-07-22 PROCEDURE — 99999 PR PBB SHADOW E&M-EST. PATIENT-LVL IV: CPT | Mod: PBBFAC,,, | Performed by: NURSE PRACTITIONER

## 2019-07-22 PROCEDURE — 71045 X-RAY EXAM CHEST 1 VIEW: CPT | Mod: 26,,, | Performed by: RADIOLOGY

## 2019-07-22 PROCEDURE — 3078F PR MOST RECENT DIASTOLIC BLOOD PRESSURE < 80 MM HG: ICD-10-PCS | Mod: CPTII,S$GLB,, | Performed by: NURSE PRACTITIONER

## 2019-07-22 PROCEDURE — 1101F PT FALLS ASSESS-DOCD LE1/YR: CPT | Mod: CPTII,S$GLB,, | Performed by: NURSE PRACTITIONER

## 2019-07-22 PROCEDURE — 3075F PR MOST RECENT SYSTOLIC BLOOD PRESS GE 130-139MM HG: ICD-10-PCS | Mod: CPTII,S$GLB,, | Performed by: NURSE PRACTITIONER

## 2019-07-22 PROCEDURE — 99999 PR PBB SHADOW E&M-EST. PATIENT-LVL IV: ICD-10-PCS | Mod: PBBFAC,,, | Performed by: NURSE PRACTITIONER

## 2019-07-22 RX ORDER — AMLODIPINE BESYLATE 2.5 MG/1
2.5 TABLET ORAL NIGHTLY
Qty: 30 TABLET | Refills: 11 | Status: SHIPPED | OUTPATIENT
Start: 2019-07-22 | End: 2020-07-21

## 2019-07-22 RX ORDER — TRAMADOL HYDROCHLORIDE 50 MG/1
TABLET ORAL
Refills: 0 | COMMUNITY
Start: 2019-06-06

## 2019-07-22 RX ORDER — POTASSIUM CHLORIDE 750 MG/1
10 TABLET, EXTENDED RELEASE ORAL DAILY
Qty: 30 TABLET | Refills: 6 | Status: SHIPPED | OUTPATIENT
Start: 2019-07-22

## 2019-07-22 RX ORDER — INDAPAMIDE 2.5 MG/1
2.5 TABLET ORAL DAILY
Qty: 30 TABLET | Refills: 6 | Status: SHIPPED | OUTPATIENT
Start: 2019-07-22

## 2019-07-22 RX ORDER — PROMETHAZINE HYDROCHLORIDE AND DEXTROMETHORPHAN HYDROBROMIDE 6.25; 15 MG/5ML; MG/5ML
5 SYRUP ORAL NIGHTLY PRN
Qty: 120 ML | Refills: 0 | Status: SHIPPED | OUTPATIENT
Start: 2019-07-22 | End: 2019-09-17

## 2019-07-22 NOTE — PROGRESS NOTES
Subjective:     Judi Brown is a 68 y.o. female who presents to the office today for a preoperative consultation at the request of surgeon Dr. Velazquez who plans on performing c3-5 acdf, c6 corpectomy, c3-7 plating on August 15. This consultation is requested for the specific conditions prompting preoperative evaluation (i.e. because of potential affect on operative risk). Planned anesthesia: general. The patient has the following known anesthesia issues: has nausea and vomiting after anesthesia, usually gets a patch behind her ear to prevent nausea during surgery, Diluadid caused her to have difficulity waking up and had to take narcan after anesthesia. . Patients bleeding risk: none. Patient does not have objections to receiving blood products if needed.    The following portions of the patient's history were reviewed and updated as appropriate:  Past Medical History:   Diagnosis Date    Allergy     Arthritis     Cyst of right kidney     Hypertension      Past Medical History:   Diagnosis Date    Allergy     Arthritis     Cyst of right kidney     Hypertension      Family History   Problem Relation Age of Onset    Hypothyroidism Mother     Hypertension Sister     Hypertension Brother     Glaucoma Brother     Blindness Cousin     Diabetes Cousin     Cancer Cousin     Cataracts Cousin     Hypertension Son     Hypertension Other     Breast cancer Maternal Cousin     Breast cancer Maternal Cousin     Ovarian cancer Neg Hx     Deep vein thrombosis Neg Hx     Macular degeneration Neg Hx     Retinal detachment Neg Hx     Strabismus Neg Hx     Thyroid disease Neg Hx      Past Surgical History:   Procedure Laterality Date    BREAST BIOPSY      BUNIONECTOMY      bilateral    COLONOSCOPY N/A 11/16/2016    Performed by [facility], [facility] at [facility]    ESOPHAGOGASTRODUODENOSCOPY (EGD) N/A 11/16/2016    Performed by [facility], [facility] at [facility] END[facility]    fatty tumor removal      lt  shoulder    JOINT REPLACEMENT      TONSILLECTOMY      TOTAL KNEE ARTHROPLASTY      bilateral    TUBAL LIGATION       Social History     Socioeconomic History    Marital status:      Spouse name: Not on file    Number of children: 2    Years of education: Not on file    Highest education level: Not on file   Occupational History    Not on file   Social Needs    Financial resource strain: Not on file    Food insecurity:     Worry: Not on file     Inability: Not on file    Transportation needs:     Medical: Not on file     Non-medical: Not on file   Tobacco Use    Smoking status: Never Smoker    Smokeless tobacco: Never Used   Substance and Sexual Activity    Alcohol use: No    Drug use: No    Sexual activity: Never     Birth control/protection: None   Lifestyle    Physical activity:     Days per week: Not on file     Minutes per session: Not on file    Stress: Not on file   Relationships    Social connections:     Talks on phone: Not on file     Gets together: Not on file     Attends L[employer] service: Not on file     Active member of club or organization: Not on file     Attends meetings of clubs or organizations: Not on file     Relationship status: Not on file   Other Topics Concern    Are you pregnant or think you may be? 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     Review of patient's allergies indicates:   Allergen Reactions    Hydromorphone (bulk) Shortness Of Breath     Other reaction(s): Difficulty breathing    Cephalexin Nausea And Vomiting    Codeine Nausea And Vomiting     Other reaction(s): Vomiting  Other reaction(s): Itching    Doxycycline Nausea And Vomiting    Erythromycin Nausea And Vomiting     Other reaction(s): Nausea    Hydrocodone-acetaminophen Other (See Comments)     Other reaction(s): Itching    Ibuprofen Other (See Comments)     Other reaction(s): Stomach upset    Meperidine  Hallucinations     Other reaction(s): Hallucinations    Naproxen sodium Other (See Comments)     Other reaction(s): Stomach upset    Neurontin [gabapentin] Swelling    Penicillins      Other reaction(s): Rash    Penicillin Rash     Medication List with Changes/Refills   New Medications    PROMETHAZINE-DEXTROMETHORPHAN (PROMETHAZINE-DM) 6.25-15 MG/5 ML SYRP    Take 5 mLs by mouth nightly as needed (Cough).   Current Medications    ALBUTEROL (PROVENTIL HFA) 90 MCG/ACTUATION INHALER    Inhale 2 puffs into the lungs every 4 (four) hours as needed for Wheezing. 2 Aerosol Inhalation Every day    ALBUTEROL-IPRATROPIUM (DUO-NEB) 2.5 MG-0.5 MG/3 ML NEBULIZER SOLUTION    Take 3 mLs by nebulization every 6 (six) hours as needed for Wheezing. Rescue    BENZONATATE (TESSALON) 200 MG CAPSULE    Take 1 capsule (200 mg total) by mouth 3 (three) times daily as needed for Cough.    COLCRYS 0.6 MG TABLET    TAKE 1 TABLET BY MOUTH ONCE DAILY    DICLOFENAC SODIUM 1 % GEL    Apply 2-4 grams to each painful area four times daily. Maximum 32 grams/day.    DIMENHYDRINATE (DRAMAMINE ORAL)    Take by mouth daily as needed (unsure of dose amount but states she takes 2 pills twice a day as needed).    DIPHENHYDRAMINE (BENADRYL ALLERGY) 25 MG TABLET    Take 25 mg by mouth 2 (two) times daily as needed for Insomnia.    ECONAZOLE NITRATE 1 % CREAM    Apply topically 2 (two) times daily. AAA of face qd x 2 weeks.    ERGOCALCIFEROL (VITAMIN D2) 50,000 UNIT CAP    Take 1 capsule (50,000 Units total) by mouth every 7 days.    FISH OIL-OMEGA-3 FATTY ACIDS 300-1,000 MG CAPSULE    Take 1 g by mouth 3 (three) times daily.    FLUTICASONE PROPIONATE (FLONASE) 50 MCG/ACTUATION NASAL SPRAY    2 sprays (100 mcg total) by Each Nare route daily as needed for Rhinitis. 2 Aerosol, Spray Nasal Every day    KETOCONAZOLE (NIZORAL) 2 % CREAM    Apply topically 2 (two) times daily. AAA of scalp and face bid prn    MINERAL OIL-HYDROPHIL PETROLAT (AQUAPHOR) OINT     Apply topically as needed.    OLOPATADINE (PATANOL) 0.1 % OPHTHALMIC SOLUTION    INSTILL 1 DROP IN EACH EYE TWICE A DAY.    TRAMADOL (ULTRAM) 50 MG TABLET    TAKE 1 TABLET BY MOUTH TWICE DAILY AS NEEDED FOR PAIN RELIEF    TRIAMCINOLONE ACETONIDE 0.1% (KENALOG) 0.1 % CREAM    Apply topically 2 (two) times daily. May use up to 2 times a day. Do not use on face   Changed and/or Refilled Medications    Modified Medication Previous Medication    AMLODIPINE (NORVASC) 2.5 MG TABLET amLODIPine (NORVASC) 2.5 MG tablet       Take 1 tablet (2.5 mg total) by mouth nightly.    Take 1 tablet (2.5 mg total) by mouth nightly.    INDAPAMIDE (LOZOL) 2.5 MG TAB indapamide (LOZOL) 2.5 MG Tab       Take 1 tablet (2.5 mg total) by mouth once daily.    TAKE 1 TABLET BY MOUTH ONCE DAILY    POTASSIUM CHLORIDE (KLOR-CON) 10 MEQ TBSR potassium chloride (KLOR-CON) 10 MEQ TbSR       Take 1 tablet (10 mEq total) by mouth once daily.    TAKE 1 TABLET BY MOUTH ONCE DAILY   Discontinued Medications    METHYLPREDNISOLONE (MEDROL DOSEPACK) 4 MG TABLET    use as directed     Patient Active Problem List   Diagnosis    Hypertension    Allergy    Arthritis    Weight loss    Morbid obesity    Acute gout of foot       Review of Systems  Review of Systems   Constitutional: Negative for activity change, appetite change, chills, diaphoresis, fatigue, fever and unexpected weight change.   HENT: Negative.    Eyes: Negative for visual disturbance.   Respiratory: Negative for cough, shortness of breath and wheezing.    Cardiovascular: Negative for chest pain, palpitations and leg swelling.   Gastrointestinal: Negative for abdominal distention, abdominal pain, blood in stool, constipation, diarrhea, nausea and vomiting.   Genitourinary: Negative for decreased urine volume, difficulty urinating, dysuria, frequency, hematuria and urgency.   Musculoskeletal: Positive for arthralgias, back pain and neck pain.   Neurological: Negative.  Negative for dizziness,  syncope, speech difficulty, light-headedness and headaches.   Psychiatric/Behavioral: Negative for agitation, confusion and hallucinations. The patient is not nervous/anxious.         Objective:     Vitals:    07/22/19 1731   BP: 136/74   Pulse: 74   Resp: 16   Temp: 97.7 °F (36.5 °C)       Physical Exam   Constitutional: She is oriented to person, place, and time. She appears well-developed and well-nourished. She is cooperative. No distress.   HENT:   Head: Normocephalic and atraumatic.   Eyes: Conjunctivae are normal. Right eye exhibits no discharge. Left eye exhibits no discharge.   Cardiovascular: Normal rate, regular rhythm and normal heart sounds.   No murmur heard.  Pulmonary/Chest: Effort normal and breath sounds normal. No respiratory distress. She has no wheezes. She has no rales. She exhibits no tenderness.   Abdominal: Soft. She exhibits no distension.   Musculoskeletal: Normal range of motion.   Neurological: She is alert and oriented to person, place, and time.   Skin: Skin is warm and dry. No rash noted. She is not diaphoretic.   Psychiatric: She has a normal mood and affect. Her behavior is normal. Judgment and thought content normal.   Nursing note and vitals reviewed.       Assessment:       Encounter Diagnoses   Name Primary?    Pre-op examination Yes    Cervical stenosis of spine     Essential hypertension         Plan:     Pre-op examination  -     Comprehensive metabolic panel; Future; Expected date: 07/22/2019  -     CBC auto differential; Future; Expected date: 07/22/2019  -     X-Ray Chest 1 View; Future; Expected date: 07/22/2019  -     Protime-INR; Future; Expected date: 07/22/2019  -     APTT; Future; Expected date: 07/22/2019  -     URINALYSIS    Cervical stenosis of spine  -     Comprehensive metabolic panel; Future; Expected date: 07/22/2019  -     CBC auto differential; Future; Expected date: 07/22/2019  -     X-Ray Chest 1 View; Future; Expected date: 07/22/2019  -      Protime-INR; Future; Expected date: 07/22/2019  -     APTT; Future; Expected date: 07/22/2019  -     URINALYSIS    Essential hypertension  -     Comprehensive metabolic panel; Future; Expected date: 07/22/2019  -     CBC auto differential; Future; Expected date: 07/22/2019  -     X-Ray Chest 1 View; Future; Expected date: 07/22/2019  -     Protime-INR; Future; Expected date: 07/22/2019  -     APTT; Future; Expected date: 07/22/2019  -     URINALYSIS  -     amLODIPine (NORVASC) 2.5 MG tablet; Take 1 tablet (2.5 mg total) by mouth nightly.  Dispense: 30 tablet; Refill: 11    Other orders  -     indapamide (LOZOL) 2.5 MG Tab; Take 1 tablet (2.5 mg total) by mouth once daily.  Dispense: 30 tablet; Refill: 6  -     potassium chloride (KLOR-CON) 10 MEQ TbSR; Take 1 tablet (10 mEq total) by mouth once daily.  Dispense: 30 tablet; Refill: 6  -     promethazine-dextromethorphan (PROMETHAZINE-DM) 6.25-15 mg/5 mL Syrp; Take 5 mLs by mouth nightly as needed (Cough).  Dispense: 120 mL; Refill: 0        I reviewed the patient's past medical, surgical, social and family history and with  physical exam findings and the proposed surgery and I make the following recommendations:     From a cardiac standpoint the patient has been instructed by her neurosurgeon that she will need cardiac clearance. She has an appointment scheduled. She will need an EKG at that visit.    From a pulmonary standpoint the patient presents as a good candidate as well. The patient has no history of lung disease or pulmonary symptoms. Good pulmonary toilet, incentive spirometry, early ambulation are all recommended to improve the pulmonary outcome. No further pulmonary workup as noted prior to surgery.     DVT prophylaxis should be per standard. Venous compression devices are recommended. Early ambulation. Patient has been educated on signs and symptoms of both DVT and pulmonary embolus and instructed on what to do if there are symptoms postop.     The  patient has been instructed to take blood pressure medication the morning of surgery with a sip of water. Avoid any aspirin or anti-inflammatories between now and surgery.     If there is any further I can do to assist in the care of this patient please not hesitate to contact me. I will forward the lab results upon my receipt.    Med refills as above per request.     Leana Colon, YANAC

## 2019-07-23 LAB
BILIRUB UR QL STRIP: NEGATIVE
CLARITY UR: CLEAR
COLOR UR: YELLOW
GLUCOSE UR QL STRIP: NEGATIVE
HGB UR QL STRIP: NEGATIVE
KETONES UR QL STRIP: NEGATIVE
LEUKOCYTE ESTERASE UR QL STRIP: NEGATIVE
NITRITE UR QL STRIP: NEGATIVE
PH UR STRIP: 7 [PH] (ref 5–8)
PROT UR QL STRIP: NEGATIVE
SP GR UR STRIP: 1.01 (ref 1–1.03)
URN SPEC COLLECT METH UR: NORMAL

## 2019-07-24 ENCOUNTER — LAB VISIT (OUTPATIENT)
Dept: LAB | Facility: HOSPITAL | Age: 69
End: 2019-07-24
Attending: FAMILY MEDICINE
Payer: MEDICARE

## 2019-07-24 DIAGNOSIS — Z01.818 PRE-OP EXAMINATION: ICD-10-CM

## 2019-07-24 DIAGNOSIS — M48.02 CERVICAL STENOSIS OF SPINE: ICD-10-CM

## 2019-07-24 DIAGNOSIS — I10 ESSENTIAL HYPERTENSION: ICD-10-CM

## 2019-07-24 LAB
ALBUMIN SERPL BCP-MCNC: 3.9 G/DL (ref 3.5–5.2)
ALP SERPL-CCNC: 58 U/L (ref 55–135)
ALT SERPL W/O P-5'-P-CCNC: 38 U/L (ref 10–44)
ANION GAP SERPL CALC-SCNC: 9 MMOL/L (ref 8–16)
APTT BLDCRRT: 25.7 SEC (ref 21–32)
AST SERPL-CCNC: 31 U/L (ref 10–40)
BASOPHILS # BLD AUTO: 0.07 K/UL (ref 0–0.2)
BASOPHILS NFR BLD: 1.6 % (ref 0–1.9)
BILIRUB SERPL-MCNC: 0.7 MG/DL (ref 0.1–1)
BUN SERPL-MCNC: 14 MG/DL (ref 8–23)
CALCIUM SERPL-MCNC: 10.3 MG/DL (ref 8.7–10.5)
CHLORIDE SERPL-SCNC: 101 MMOL/L (ref 95–110)
CO2 SERPL-SCNC: 30 MMOL/L (ref 23–29)
CREAT SERPL-MCNC: 1 MG/DL (ref 0.5–1.4)
DIFFERENTIAL METHOD: ABNORMAL
EOSINOPHIL # BLD AUTO: 0.3 K/UL (ref 0–0.5)
EOSINOPHIL NFR BLD: 6.3 % (ref 0–8)
ERYTHROCYTE [DISTWIDTH] IN BLOOD BY AUTOMATED COUNT: 13.2 % (ref 11.5–14.5)
EST. GFR  (AFRICAN AMERICAN): >60 ML/MIN/1.73 M^2
EST. GFR  (NON AFRICAN AMERICAN): 58.4 ML/MIN/1.73 M^2
GLUCOSE SERPL-MCNC: 99 MG/DL (ref 70–110)
HCT VFR BLD AUTO: 41.5 % (ref 37–48.5)
HGB BLD-MCNC: 12.9 G/DL (ref 12–16)
IMM GRANULOCYTES # BLD AUTO: 0.02 K/UL (ref 0–0.04)
IMM GRANULOCYTES NFR BLD AUTO: 0.4 % (ref 0–0.5)
INR PPP: 1 (ref 0.8–1.2)
LYMPHOCYTES # BLD AUTO: 1.2 K/UL (ref 1–4.8)
LYMPHOCYTES NFR BLD: 26.3 % (ref 18–48)
MCH RBC QN AUTO: 31 PG (ref 27–31)
MCHC RBC AUTO-ENTMCNC: 31.1 G/DL (ref 32–36)
MCV RBC AUTO: 100 FL (ref 82–98)
MONOCYTES # BLD AUTO: 0.4 K/UL (ref 0.3–1)
MONOCYTES NFR BLD: 9.2 % (ref 4–15)
NEUTROPHILS # BLD AUTO: 2.5 K/UL (ref 1.8–7.7)
NEUTROPHILS NFR BLD: 56.2 % (ref 38–73)
NRBC BLD-RTO: 0 /100 WBC
PLATELET # BLD AUTO: 159 K/UL (ref 150–350)
PMV BLD AUTO: 12.4 FL (ref 9.2–12.9)
POTASSIUM SERPL-SCNC: 3.7 MMOL/L (ref 3.5–5.1)
PROT SERPL-MCNC: 7.7 G/DL (ref 6–8.4)
PROTHROMBIN TIME: 10.4 SEC (ref 9–12.5)
RBC # BLD AUTO: 4.16 M/UL (ref 4–5.4)
SODIUM SERPL-SCNC: 140 MMOL/L (ref 136–145)
WBC # BLD AUTO: 4.45 K/UL (ref 3.9–12.7)

## 2019-07-24 PROCEDURE — 85025 COMPLETE CBC W/AUTO DIFF WBC: CPT

## 2019-07-24 PROCEDURE — 80053 COMPREHEN METABOLIC PANEL: CPT

## 2019-07-24 PROCEDURE — 85610 PROTHROMBIN TIME: CPT

## 2019-07-24 PROCEDURE — 36415 COLL VENOUS BLD VENIPUNCTURE: CPT | Mod: PO

## 2019-07-24 PROCEDURE — 85730 THROMBOPLASTIN TIME PARTIAL: CPT

## 2019-07-26 ENCOUNTER — CLINICAL SUPPORT (OUTPATIENT)
Dept: CARDIOLOGY | Facility: CLINIC | Age: 69
End: 2019-07-26
Payer: MEDICARE

## 2019-07-26 ENCOUNTER — OFFICE VISIT (OUTPATIENT)
Dept: CARDIOLOGY | Facility: CLINIC | Age: 69
End: 2019-07-26
Payer: MEDICARE

## 2019-07-26 ENCOUNTER — TELEPHONE (OUTPATIENT)
Dept: INTERNAL MEDICINE | Facility: CLINIC | Age: 69
End: 2019-07-26

## 2019-07-26 VITALS
SYSTOLIC BLOOD PRESSURE: 120 MMHG | HEART RATE: 94 BPM | HEIGHT: 61 IN | BODY MASS INDEX: 40.22 KG/M2 | DIASTOLIC BLOOD PRESSURE: 74 MMHG | WEIGHT: 213 LBS

## 2019-07-26 DIAGNOSIS — I10 ESSENTIAL HYPERTENSION: ICD-10-CM

## 2019-07-26 DIAGNOSIS — Z01.810 PREOP CARDIOVASCULAR EXAM: Primary | ICD-10-CM

## 2019-07-26 DIAGNOSIS — I51.7 LVH (LEFT VENTRICULAR HYPERTROPHY): ICD-10-CM

## 2019-07-26 DIAGNOSIS — E66.01 MORBID OBESITY: ICD-10-CM

## 2019-07-26 DIAGNOSIS — I10 ESSENTIAL HYPERTENSION: Primary | ICD-10-CM

## 2019-07-26 PROCEDURE — 3078F DIAST BP <80 MM HG: CPT | Mod: CPTII,S$GLB,, | Performed by: INTERNAL MEDICINE

## 2019-07-26 PROCEDURE — 1101F PR PT FALLS ASSESS DOC 0-1 FALLS W/OUT INJ PAST YR: ICD-10-PCS | Mod: CPTII,S$GLB,, | Performed by: INTERNAL MEDICINE

## 2019-07-26 PROCEDURE — 93010 EKG 12-LEAD: ICD-10-PCS | Mod: S$GLB,,, | Performed by: INTERNAL MEDICINE

## 2019-07-26 PROCEDURE — 93005 EKG 12-LEAD: ICD-10-PCS | Mod: S$GLB,,, | Performed by: INTERNAL MEDICINE

## 2019-07-26 PROCEDURE — 3074F PR MOST RECENT SYSTOLIC BLOOD PRESSURE < 130 MM HG: ICD-10-PCS | Mod: CPTII,S$GLB,, | Performed by: INTERNAL MEDICINE

## 2019-07-26 PROCEDURE — 1101F PT FALLS ASSESS-DOCD LE1/YR: CPT | Mod: CPTII,S$GLB,, | Performed by: INTERNAL MEDICINE

## 2019-07-26 PROCEDURE — 99204 OFFICE O/P NEW MOD 45 MIN: CPT | Mod: S$GLB,,, | Performed by: INTERNAL MEDICINE

## 2019-07-26 PROCEDURE — 99204 PR OFFICE/OUTPT VISIT, NEW, LEVL IV, 45-59 MIN: ICD-10-PCS | Mod: S$GLB,,, | Performed by: INTERNAL MEDICINE

## 2019-07-26 PROCEDURE — 3078F PR MOST RECENT DIASTOLIC BLOOD PRESSURE < 80 MM HG: ICD-10-PCS | Mod: CPTII,S$GLB,, | Performed by: INTERNAL MEDICINE

## 2019-07-26 PROCEDURE — 99999 PR PBB SHADOW E&M-EST. PATIENT-LVL III: ICD-10-PCS | Mod: PBBFAC,,, | Performed by: INTERNAL MEDICINE

## 2019-07-26 PROCEDURE — 3074F SYST BP LT 130 MM HG: CPT | Mod: CPTII,S$GLB,, | Performed by: INTERNAL MEDICINE

## 2019-07-26 PROCEDURE — 99999 PR PBB SHADOW E&M-EST. PATIENT-LVL III: CPT | Mod: PBBFAC,,, | Performed by: INTERNAL MEDICINE

## 2019-07-26 PROCEDURE — 99499 UNLISTED E&M SERVICE: CPT | Mod: S$GLB,,, | Performed by: INTERNAL MEDICINE

## 2019-07-26 PROCEDURE — 99499 RISK ADDL DX/OHS AUDIT: ICD-10-PCS | Mod: S$GLB,,, | Performed by: INTERNAL MEDICINE

## 2019-07-26 PROCEDURE — 93010 ELECTROCARDIOGRAM REPORT: CPT | Mod: S$GLB,,, | Performed by: INTERNAL MEDICINE

## 2019-07-26 PROCEDURE — 93005 ELECTROCARDIOGRAM TRACING: CPT | Mod: S$GLB,,, | Performed by: INTERNAL MEDICINE

## 2019-07-26 NOTE — PROGRESS NOTES
Subjective:   Patient ID:  Judi Brown is a 68 y.o. female who presents for cardiac consult of Tingling (neck shoulder and upper back) and Pre-op Exam      HPI  The patient came in today for cardiac consult of Tingling (neck shoulder and upper back) and Pre-op Exam    7/26/19  Judi Brown is a 68 y.o. female pt with HTN, renal cyst, OA, HLD, obesity presents for preop CV eval.     She will have c3-5 acdf, c6 corpectomy, c3-7 plating on August 15 by Dr. Velazquez. She has neck and back pain, will have neck surgery first.   She had a few surgeries in the past without complications. Overall is stable, walks a lot and plays with grandchildren.   Occ palpitations with exertion. Had bronchitis in May, needed nebs and has been doing well since then.     Patient feels  no chest pain, no sob, no leg swelling, no PND, no palpitation, no dizziness, no syncope, no CNS symptoms.    Patient has fairly good exercise tolerance. 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     Patient is compliant with medications.    FH - HTN    ECG - NSR, inc RBBB, LVH, nonspec ST T wave changes    Past Medical History:   Diagnosis Date    Allergy     Arthritis     Cyst of right kidney     Hypertension        Past Surgical History:   Procedure Laterality Date    BREAST BIOPSY      BUNIONECTOMY      bilateral    COLONOSCOPY N/A 11/16/2016    Performed by Cj Kruger III, MD at [facility] END[facility]    ESOPHAGOGASTRODUODENOSCOPY (EGD) N/A 11/16/2016    Performed by Cj Kruger III, MD at [facility] ENDO    fatty tumor removal      lt shoulder    JOINT REPLACEMENT      TONSILLECTOMY      TOTAL KNEE ARTHROPLASTY      bilateral    TUBAL LIGATION         Social History     Tobacco Use    Smoking status: Never Smoker    Smokeless tobacco: Never Used   Substance Use Topics    Alcohol use: No    Drug use: No       Family History   Problem Relation Age of Onset    Hypothyroidism Mother     Hypertension Sister     Hypertension Brother      Glaucoma Brother     Blindness Cousin     Diabetes Cousin     Cancer Cousin     Cataracts Cousin     Hypertension Son     Hypertension Other     Breast cancer Maternal Cousin     Breast cancer Maternal Cousin     Ovarian cancer Neg Hx     Deep vein thrombosis Neg Hx     Macular degeneration Neg Hx     Retinal detachment Neg Hx     Strabismus Neg Hx     Thyroid disease Neg Hx        Patient's Medications   New Prescriptions    No medications on file   Previous Medications    ALBUTEROL (PROVENTIL HFA) 90 MCG/ACTUATION INHALER    Inhale 2 puffs into the lungs every 4 (four) hours as needed for Wheezing. 2 Aerosol Inhalation Every day    ALBUTEROL-IPRATROPIUM (DUO-NEB) 2.5 MG-0.5 MG/3 ML NEBULIZER SOLUTION    Take 3 mLs by nebulization every 6 (six) hours as needed for Wheezing. Rescue    AMLODIPINE (NORVASC) 2.5 MG TABLET    Take 1 tablet (2.5 mg total) by mouth nightly.    BENZONATATE (TESSALON) 200 MG CAPSULE    Take 1 capsule (200 mg total) by mouth 3 (three) times daily as needed for Cough.    COLCRYS 0.6 MG TABLET    TAKE 1 TABLET BY MOUTH ONCE DAILY    DICLOFENAC SODIUM 1 % GEL    Apply 2-4 grams to each painful area four times daily. Maximum 32 grams/day.    DIMENHYDRINATE (DRAMAMINE ORAL)    Take by mouth daily as needed (unsure of dose amount but states she takes 2 pills twice a day as needed).    DIPHENHYDRAMINE (BENADRYL ALLERGY) 25 MG TABLET    Take 25 mg by mouth 2 (two) times daily as needed for Insomnia.    ECONAZOLE NITRATE 1 % CREAM    Apply topically 2 (two) times daily. AAA of face qd x 2 weeks.    ERGOCALCIFEROL (VITAMIN D2) 50,000 UNIT CAP    Take 1 capsule (50,000 Units total) by mouth every 7 days.    FISH OIL-OMEGA-3 FATTY ACIDS 300-1,000 MG CAPSULE    Take 1 g by mouth 3 (three) times daily.    FLUTICASONE PROPIONATE (FLONASE) 50 MCG/ACTUATION NASAL SPRAY    2 sprays (100 mcg total) by Each Nare route daily as needed for Rhinitis. 2 Aerosol, Spray Nasal Every day    INDAPAMIDE  (LOZOL) 2.5 MG TAB    Take 1 tablet (2.5 mg total) by mouth once daily.    KETOCONAZOLE (NIZORAL) 2 % CREAM    Apply topically 2 (two) times daily. AAA of scalp and face bid prn    MINERAL OIL-HYDROPHIL PETROLAT (AQUAPHOR) OINT    Apply topically as needed.    OLOPATADINE (PATANOL) 0.1 % OPHTHALMIC SOLUTION    INSTILL 1 DROP IN EACH EYE TWICE A DAY.    POTASSIUM CHLORIDE (KLOR-CON) 10 MEQ TBSR    Take 1 tablet (10 mEq total) by mouth once daily.    PROMETHAZINE-DEXTROMETHORPHAN (PROMETHAZINE-DM) 6.25-15 MG/5 ML SYRP    Take 5 mLs by mouth nightly as needed (Cough).    TRAMADOL (ULTRAM) 50 MG TABLET    TAKE 1 TABLET BY MOUTH TWICE DAILY AS NEEDED FOR PAIN RELIEF    TRIAMCINOLONE ACETONIDE 0.1% (KENALOG) 0.1 % CREAM    Apply topically 2 (two) times daily. May use up to 2 times a day. Do not use on face   Modified Medications    No medications on file   Discontinued Medications    No medications on file       Review of Systems   Constitutional: Negative.    HENT: Negative.    Eyes: Negative.    Respiratory: Negative.    Cardiovascular: Positive for palpitations (rare with exertion). Negative for chest pain.   Gastrointestinal: Negative.    Genitourinary: Negative.    Musculoskeletal: Positive for back pain, joint pain and neck pain.   Skin: Negative.    Neurological: Negative.    Endo/Heme/Allergies: Negative.    Psychiatric/Behavioral: Negative.    All 12 systems otherwise negative.      Wt Readings from Last 3 Encounters:   07/26/19 96.6 kg (213 lb)   07/22/19 97.5 kg (214 lb 15.2 oz)   05/23/19 97.2 kg (214 lb 4.6 oz)     Temp Readings from Last 3 Encounters:   07/22/19 97.7 °F (36.5 °C) (Tympanic)   05/23/19 96.7 °F (35.9 °C) (Tympanic)   05/08/19 99.5 °F (37.5 °C) (Oral)     BP Readings from Last 3 Encounters:   07/26/19 120/74   07/22/19 136/74   05/23/19 130/80     Pulse Readings from Last 3 Encounters:   07/26/19 94   07/22/19 74   05/23/19 98       /74 (BP Location: Right arm, Patient Position: Sitting,  "BP Method: Medium (Automatic))   Pulse 94   Ht 5' 1" (1.549 m)   Wt 96.6 kg (213 lb)   BMI 40.25 kg/m²     Objective:   Physical Exam   Constitutional: She is oriented to person, place, and time. She appears well-developed and well-nourished. No distress.   HENT:   Head: Normocephalic and atraumatic.   Nose: Nose normal.   Mouth/Throat: Oropharynx is clear and moist.   Eyes: Conjunctivae and EOM are normal. No scleral icterus.   Neck: Normal range of motion. Neck supple. No JVD present. No thyromegaly present.   Cardiovascular: Normal rate, regular rhythm, S1 normal and S2 normal. Exam reveals no gallop, no S3, no S4 and no friction rub.   No murmur heard.  Pulmonary/Chest: Effort normal and breath sounds normal. No stridor. No respiratory distress. She has no wheezes. She has no rales. She exhibits no tenderness.   Abdominal: Soft. Bowel sounds are normal. She exhibits no distension and no mass. There is no tenderness. There is no rebound.   Genitourinary:   Genitourinary Comments: Deferred   Musculoskeletal: Normal range of motion. She exhibits no edema, tenderness or deformity.   Lymphadenopathy:     She has no cervical adenopathy.   Neurological: She is alert and oriented to person, place, and time. She exhibits normal muscle tone. Coordination normal.   Skin: Skin is warm and dry. No rash noted. She is not diaphoretic. No erythema. No pallor.   Psychiatric: She has a normal mood and affect. Her behavior is normal. Judgment and thought content normal.   Nursing note and vitals reviewed.      Lab Results   Component Value Date     07/24/2019    K 3.7 07/24/2019     07/24/2019    CO2 30 (H) 07/24/2019    BUN 14 07/24/2019    CREATININE 1.0 07/24/2019    GLU 99 07/24/2019    HGBA1C 5.5 05/11/2018    AST 31 07/24/2019    ALT 38 07/24/2019    ALBUMIN 3.9 07/24/2019    PROT 7.7 07/24/2019    BILITOT 0.7 07/24/2019    WBC 4.45 07/24/2019    HGB 12.9 07/24/2019    HCT 41.5 07/24/2019     (H) " 07/24/2019     07/24/2019    INR 1.0 07/24/2019    TSH 1.531 05/11/2018    CHOL 159 05/18/2018    HDL 41 05/18/2018    LDLCALC 100.4 05/18/2018    TRIG 88 05/18/2018     Assessment:      1. Preop cardiovascular exam    2. Essential hypertension    3. Morbid obesity    4. LVH (left ventricular hypertrophy)        Plan:   1. Pre-OP CV evaluation prior to c3-5 acdf, c6 corpectomy, c3-7 plating  Low periop risk of CV events for moderate risk procedure.  Good functional and exercise capacity.  No chest pain, active arrhythmia and CHF symptoms.  Ok to proceed to the scheduled surgery without further cardiac study.    2. HTN with LVH on ECG  - cont meds  - well controlled  - check ECHO eval valves/LVH    3. Obesity  - rec weight loss with diet/exercise     Thank you for allowing me to participate in this patient's care. Please do not hesitate to contact me with any questions or concerns. Consult note has been forwarded to the referral physician.     Darren Woods MD, [facility]  Cardiovascular Disease  [facility], [place]  [phone] (P)

## 2019-07-26 NOTE — TELEPHONE ENCOUNTER
----- Message from Kasey Thibodeaux sent at 7/26/2019  3:38 PM CDT -----  [facility]  Caller states that they have not received  The pt EKG and urinalysis. fax over to: [phone] and if there are any questions give them a call at  [phone]

## 2019-09-16 ENCOUNTER — TELEPHONE (OUTPATIENT)
Dept: INTERNAL MEDICINE | Facility: CLINIC | Age: 69
End: 2019-09-16

## 2019-09-16 NOTE — TELEPHONE ENCOUNTER
Spoke to pt. Pt stated that since recent surgery, her preexisting nausea has worsened. She is requesting something be called in for nausea.

## 2019-09-16 NOTE — TELEPHONE ENCOUNTER
----- Message from Alexa Zacarias sent at 9/16/2019 11:43 AM CDT -----  Type:  Needs Medical Advice    Who Called: self  Symptoms (please be specific): nausea (otc meds aren't working)  How long has patient had these symptoms:  awsonido  Pharmacy name and phone #: .  [id number] - [place], [place] - [address]  [address]  [place] [address]  Phone: [phone] Fax: [phone]  Would the patient rather a call back or a response via [facility]? call  Best Call Back Number: .[phone] (home)   Additional Information:

## 2019-09-17 ENCOUNTER — OFFICE VISIT (OUTPATIENT)
Dept: INTERNAL MEDICINE | Facility: CLINIC | Age: 69
End: 2019-09-17
Payer: MEDICARE

## 2019-09-17 ENCOUNTER — HOSPITAL ENCOUNTER (OUTPATIENT)
Dept: RADIOLOGY | Facility: HOSPITAL | Age: 69
Discharge: HOME | End: 2019-09-17
Attending: NURSE PRACTITIONER
Payer: MEDICARE

## 2019-09-17 VITALS
RESPIRATION RATE: 16 BRPM | WEIGHT: 185.88 LBS | HEART RATE: 90 BPM | HEIGHT: 61 IN | SYSTOLIC BLOOD PRESSURE: 138 MMHG | DIASTOLIC BLOOD PRESSURE: 89 MMHG | TEMPERATURE: 98 F | BODY MASS INDEX: 35.09 KG/M2

## 2019-09-17 DIAGNOSIS — R53.83 FATIGUE, UNSPECIFIED TYPE: ICD-10-CM

## 2019-09-17 DIAGNOSIS — R68.81 EARLY SATIETY: ICD-10-CM

## 2019-09-17 DIAGNOSIS — R11.0 NAUSEA: ICD-10-CM

## 2019-09-17 DIAGNOSIS — K59.00 CONSTIPATION, UNSPECIFIED CONSTIPATION TYPE: ICD-10-CM

## 2019-09-17 DIAGNOSIS — R63.0 DECREASED APPETITE: Primary | ICD-10-CM

## 2019-09-17 PROCEDURE — 3075F PR MOST RECENT SYSTOLIC BLOOD PRESS GE 130-139MM HG: ICD-10-PCS | Mod: CPTII,S$GLB,, | Performed by: NURSE PRACTITIONER

## 2019-09-17 PROCEDURE — 1101F PT FALLS ASSESS-DOCD LE1/YR: CPT | Mod: CPTII,S$GLB,, | Performed by: NURSE PRACTITIONER

## 2019-09-17 PROCEDURE — 99214 PR OFFICE/OUTPT VISIT, EST, LEVL IV, 30-39 MIN: ICD-10-PCS | Mod: S$GLB,,, | Performed by: NURSE PRACTITIONER

## 2019-09-17 PROCEDURE — 74019 RADEX ABDOMEN 2 VIEWS: CPT | Mod: 26,,, | Performed by: RADIOLOGY

## 2019-09-17 PROCEDURE — 3079F PR MOST RECENT DIASTOLIC BLOOD PRESSURE 80-89 MM HG: ICD-10-PCS | Mod: CPTII,S$GLB,, | Performed by: NURSE PRACTITIONER

## 2019-09-17 PROCEDURE — 99999 PR PBB SHADOW E&M-EST. PATIENT-LVL V: ICD-10-PCS | Mod: PBBFAC,,, | Performed by: NURSE PRACTITIONER

## 2019-09-17 PROCEDURE — 74019 RADEX ABDOMEN 2 VIEWS: CPT | Mod: TC,FY,PO

## 2019-09-17 PROCEDURE — 74019 XR ABDOMEN FLAT AND ERECT: ICD-10-PCS | Mod: 26,,, | Performed by: RADIOLOGY

## 2019-09-17 PROCEDURE — 99999 PR PBB SHADOW E&M-EST. PATIENT-LVL V: CPT | Mod: PBBFAC,,, | Performed by: NURSE PRACTITIONER

## 2019-09-17 PROCEDURE — 3075F SYST BP GE 130 - 139MM HG: CPT | Mod: CPTII,S$GLB,, | Performed by: NURSE PRACTITIONER

## 2019-09-17 PROCEDURE — 99214 OFFICE O/P EST MOD 30 MIN: CPT | Mod: S$GLB,,, | Performed by: NURSE PRACTITIONER

## 2019-09-17 PROCEDURE — 1101F PR PT FALLS ASSESS DOC 0-1 FALLS W/OUT INJ PAST YR: ICD-10-PCS | Mod: CPTII,S$GLB,, | Performed by: NURSE PRACTITIONER

## 2019-09-17 PROCEDURE — 3079F DIAST BP 80-89 MM HG: CPT | Mod: CPTII,S$GLB,, | Performed by: NURSE PRACTITIONER

## 2019-09-17 RX ORDER — ONDANSETRON 4 MG/1
4 TABLET, ORALLY DISINTEGRATING ORAL EVERY 8 HOURS PRN
Qty: 40 TABLET | Refills: 1 | Status: SHIPPED | OUTPATIENT
Start: 2019-09-17

## 2019-09-17 NOTE — Clinical Note
Dr locke states that if Dr. Velazquez thinks she needs a handicap tag he has to provide it. Inform patient.

## 2019-09-17 NOTE — PROGRESS NOTES
"Subjective:       Patient ID: Judi Brown is a 68 y.o. female.    Chief Complaint: Nausea    Patient comes in with concern of nausea ongoing since her c spine surgery 1 month ago. She can only eat about 3 tbs at a time. She has also developed food aversions. She is not able to eat sweets anymore as they just taste too sweet. She admits today that she has not had a BM in 2 weeks. She tried mg citrate a few weeks ago but it caused intense abdominal cramping. The mg citrate did cause her to have bm's, however the cramping was bad. She denies fever or abdominal pain today.    Wt Readings from Last 10 Encounters:  09/17/19 : 84.3 kg (185 lb 13.6 oz)  07/26/19 : 96.6 kg (213 lb)  07/22/19 : 97.5 kg (214 lb 15.2 oz)  05/23/19 : 97.2 kg (214 lb 4.6 oz)  05/08/19 : 99.6 kg (219 lb 9.3 oz)  12/14/18 : 99.3 kg (218 lb 14.7 oz)  09/17/18 : 98.5 kg (217 lb 2.5 oz)  09/11/18 : 98.9 kg (218 lb)  09/10/18 : 99 kg (218 lb 4.1 oz)  06/25/18 : 96.2 kg (212 lb)          /89 (BP Location: Left arm, Patient Position: Sitting)   Pulse 90   Temp 98.4 °F (36.9 °C) (Oral)   Resp 16   Ht 5' 1" (1.549 m)   Wt 84.3 kg (185 lb 13.6 oz)   BMI 35.12 kg/m²     Review of Systems   Constitutional: Positive for activity change, appetite change and unexpected weight change. Negative for chills, diaphoresis, fatigue and fever.   HENT: Negative.    Eyes: Negative for visual disturbance.   Respiratory: Negative for cough, shortness of breath and wheezing.    Cardiovascular: Negative for chest pain, palpitations and leg swelling.   Gastrointestinal: Positive for constipation and nausea. Negative for abdominal distention, abdominal pain, anal bleeding, blood in stool, diarrhea, rectal pain and vomiting.   Genitourinary: Negative for decreased urine volume, difficulty urinating, dysuria, frequency, hematuria and urgency.   Neurological: Negative.  Negative for dizziness, syncope, speech difficulty, light-headedness and headaches. "   Psychiatric/Behavioral: Negative for agitation, confusion and hallucinations. The patient is not nervous/anxious.        Objective:      Physical Exam   Constitutional: She is oriented to person, place, and time. She appears well-developed and well-nourished. She is cooperative. No distress.   HENT:   Head: Normocephalic and atraumatic.   Eyes: Conjunctivae are normal. Right eye exhibits no discharge. Left eye exhibits no discharge.   Neck:   c collar in place   Cardiovascular: Normal rate, regular rhythm and normal heart sounds.   No murmur heard.  Pulmonary/Chest: Effort normal and breath sounds normal. No respiratory distress. She has no wheezes. She has no rales. She exhibits no tenderness.   Abdominal: Soft. Bowel sounds are normal. She exhibits no distension and no mass. There is no tenderness. There is no rebound and no guarding. No hernia.   Musculoskeletal: Normal range of motion.   Neurological: She is alert and oriented to person, place, and time.   Skin: Skin is warm and dry. No rash noted. She is not diaphoretic.   Psychiatric: She has a normal mood and affect. Her behavior is normal. Judgment and thought content normal.   Nursing note and vitals reviewed.      Assessment:       1. Decreased appetite    2. Nausea    3. Early satiety    4. Fatigue, unspecified type    5. Constipation, unspecified constipation type        Plan:       Judi was seen today for nausea.    Diagnoses and all orders for this visit:    Decreased appetite    Nausea  -     ondansetron (ZOFRAN-ODT) 4 MG TbDL; Take 1 tablet (4 mg total) by mouth every 8 (eight) hours as needed (nausea).  -     X-Ray Abdomen Flat And Erect; Future    Early satiety  -     X-Ray Abdomen Flat And Erect; Future    Fatigue, unspecified type    Constipation, unspecified constipation type  -     X-Ray Abdomen Flat And Erect; Future  Xray does not show any significant stool in the colon. Go ahead and do a glycerin suppository today and then miralax daily 1  capful in 8 oz of fluids.  Small frequent meals with protein  zofran prn  Follow up if not improving   X-Ray Abdomen Flat And Erect  Narrative: EXAMINATION:  XR ABDOMEN FLAT AND ERECT    CLINICAL HISTORY:  Nausea    TECHNIQUE:  Flat and erect AP views of the abdomen were preformed.    COMPARISON:  None    FINDINGS:  The bowel gas pattern is nonspecific and nonobstructive.  No pathologic calcifications are identified.  No evidence for free air.  No significant stool noted within the colon.  There is levoscoliosis of the lumbar spine.  Impression: 1.  As above    Electronically signed by: Meliton Nino DO  Date:    09/18/2019  Time:    08:18

## 2019-09-18 ENCOUNTER — TELEPHONE (OUTPATIENT)
Dept: INTERNAL MEDICINE | Facility: CLINIC | Age: 69
End: 2019-09-18

## 2019-09-18 NOTE — TELEPHONE ENCOUNTER
----- Message from Leana Colon, DEJUAN-MINDY sent at 9/18/2019 10:41 AM CDT -----  Dr locke states that if Dr. Velazquez thinks she needs a handicap tag he has to provide it. Inform patient.

## 2019-09-20 ENCOUNTER — TELEPHONE (OUTPATIENT)
Dept: INTERNAL MEDICINE | Facility: CLINIC | Age: 69
End: 2019-09-20

## 2019-09-20 DIAGNOSIS — R11.2 NON-INTRACTABLE VOMITING WITH NAUSEA, UNSPECIFIED VOMITING TYPE: Primary | ICD-10-CM

## 2019-09-20 RX ORDER — PROMETHAZINE HYDROCHLORIDE 12.5 MG/1
12.5 TABLET ORAL EVERY 6 HOURS PRN
Qty: 30 TABLET | Refills: 0 | Status: SHIPPED | OUTPATIENT
Start: 2019-09-20

## 2019-09-20 NOTE — TELEPHONE ENCOUNTER
----- Message from Holger Wilburn LPN sent at 9/19/2019 11:38 AM CDT -----  Informed pt of Xray results and recommendations. Per pt she tried a stool softener on yesterday and was able to pass a little bit of stool and will try the miralax today. Pt stated that she tried the zofran twice on yesterday and it did not help with the nausea. Stated that she has been nauseated and vomiting today and would like something else sent to the pharmacy.

## 2019-09-20 NOTE — TELEPHONE ENCOUNTER
Spoke to pt. Informed her of phenergan rx sent to pharm. Also advised her to take 2 zofrans every 8 hours per teresita. She voiced understanding.

## 2019-09-23 ENCOUNTER — TELEPHONE (OUTPATIENT)
Dept: INTERNAL MEDICINE | Facility: CLINIC | Age: 69
End: 2019-09-23

## 2019-09-23 NOTE — TELEPHONE ENCOUNTER
Pt stated she has no way of coming in at this time. She stated she cannot make it to clinic any time soon. She stated she will call her insurance company to seek out where else she can go gastro wise because she is looking for something closer to her home. She is to call and schedule then.

## 2019-09-23 NOTE — TELEPHONE ENCOUNTER
----- Message from Rosina Mcclendon sent at 9/23/2019 10:28 AM CDT -----  Contact: self/[phone]  Would like to consult with nurse regarding her nausea,patient had to go to the ER on Sunday and the did Ct Scan of her stomach, and they ask her to get a Gastro. Please call back at [phone]. Thanks/ar

## 2019-09-23 NOTE — TELEPHONE ENCOUNTER
Can schedule Emergency Room follow up with Dr. GUILLEN or VASILE to review ER notes/tests and discuss

## 2019-11-11 ENCOUNTER — TELEPHONE (OUTPATIENT)
Dept: HOME HEALTH SERVICES | Facility: HOSPITAL | Age: 69
End: 2019-11-11

## 2020-08-25 ENCOUNTER — PATIENT OUTREACH (OUTPATIENT)
Dept: ADMINISTRATIVE | Facility: HOSPITAL | Age: 70
End: 2020-08-25

## 2020-08-25 NOTE — PROGRESS NOTES
Working overdue mammogram report.    Offered to schedule mammo. She declined saying she is in wheel chair after surgery and will need to check with her daughter.

## 2020-10-06 ENCOUNTER — PATIENT MESSAGE (OUTPATIENT)
Dept: ADMINISTRATIVE | Facility: HOSPITAL | Age: 70
End: 2020-10-06

## 2021-02-25 ENCOUNTER — PATIENT OUTREACH (OUTPATIENT)
Dept: ADMINISTRATIVE | Facility: HOSPITAL | Age: 71
End: 2021-02-25